# Patient Record
Sex: FEMALE | Race: OTHER | URBAN - METROPOLITAN AREA
[De-identification: names, ages, dates, MRNs, and addresses within clinical notes are randomized per-mention and may not be internally consistent; named-entity substitution may affect disease eponyms.]

---

## 2017-11-27 ENCOUNTER — EMERGENCY (EMERGENCY)
Facility: HOSPITAL | Age: 57
LOS: 1 days | Discharge: HOME | End: 2017-11-27
Attending: EMERGENCY MEDICINE
Payer: MEDICAID

## 2017-11-27 VITALS
TEMPERATURE: 98 F | OXYGEN SATURATION: 99 % | HEART RATE: 81 BPM | HEIGHT: 69 IN | DIASTOLIC BLOOD PRESSURE: 69 MMHG | WEIGHT: 125 LBS | RESPIRATION RATE: 18 BRPM | SYSTOLIC BLOOD PRESSURE: 146 MMHG

## 2017-11-27 LAB
ALBUMIN SERPL ELPH-MCNC: 3.3 G/DL — LOW (ref 3.5–5)
ANION GAP SERPL CALC-SCNC: 8 MMOL/L — SIGNIFICANT CHANGE UP (ref 5–17)
APTT BLD: 33.6 SEC — SIGNIFICANT CHANGE UP (ref 27.5–37.4)
BASOPHILS # BLD AUTO: 0.1 K/UL — SIGNIFICANT CHANGE UP (ref 0–0.2)
BASOPHILS NFR BLD AUTO: 0.9 % — SIGNIFICANT CHANGE UP (ref 0–2)
BUN SERPL-MCNC: 21 MG/DL — HIGH (ref 7–18)
CALCIUM SERPL-MCNC: 8.4 MG/DL — SIGNIFICANT CHANGE UP (ref 8.4–10.5)
CHLORIDE SERPL-SCNC: 101 MMOL/L — SIGNIFICANT CHANGE UP (ref 96–108)
CO2 SERPL-SCNC: 25 MMOL/L — SIGNIFICANT CHANGE UP (ref 22–31)
EOSINOPHIL # BLD AUTO: 0.1 K/UL — SIGNIFICANT CHANGE UP (ref 0–0.5)
EOSINOPHIL NFR BLD AUTO: 1.6 % — SIGNIFICANT CHANGE UP (ref 0–6)
GLUCOSE SERPL-MCNC: 89 MG/DL — SIGNIFICANT CHANGE UP (ref 70–99)
HCT VFR BLD CALC: 32 % — LOW (ref 34.5–45)
HGB BLD-MCNC: 10.2 G/DL — LOW (ref 11.5–15.5)
INR BLD: 0.96 RATIO — SIGNIFICANT CHANGE UP (ref 0.88–1.16)
LYMPHOCYTES # BLD AUTO: 2.9 K/UL — SIGNIFICANT CHANGE UP (ref 1–3.3)
LYMPHOCYTES # BLD AUTO: 40.3 % — SIGNIFICANT CHANGE UP (ref 13–44)
MCHC RBC-ENTMCNC: 28.6 PG — SIGNIFICANT CHANGE UP (ref 27–34)
MCHC RBC-ENTMCNC: 31.9 GM/DL — LOW (ref 32–36)
MCV RBC AUTO: 89.6 FL — SIGNIFICANT CHANGE UP (ref 80–100)
MONOCYTES # BLD AUTO: 0.7 K/UL — SIGNIFICANT CHANGE UP (ref 0–0.9)
MONOCYTES NFR BLD AUTO: 9.6 % — SIGNIFICANT CHANGE UP (ref 2–14)
NEUTROPHILS # BLD AUTO: 3.4 K/UL — SIGNIFICANT CHANGE UP (ref 1.8–7.4)
NEUTROPHILS NFR BLD AUTO: 47.6 % — SIGNIFICANT CHANGE UP (ref 43–77)
PLATELET # BLD AUTO: 159 K/UL — SIGNIFICANT CHANGE UP (ref 150–400)
POTASSIUM SERPL-MCNC: 3.9 MMOL/L — SIGNIFICANT CHANGE UP (ref 3.5–5.3)
POTASSIUM SERPL-SCNC: 3.9 MMOL/L — SIGNIFICANT CHANGE UP (ref 3.5–5.3)
PROTHROM AB SERPL-ACNC: 10.4 SEC — SIGNIFICANT CHANGE UP (ref 9.8–12.7)
RBC # BLD: 3.57 M/UL — LOW (ref 3.8–5.2)
RBC # FLD: 11.7 % — SIGNIFICANT CHANGE UP (ref 10.3–14.5)
SODIUM SERPL-SCNC: 134 MMOL/L — LOW (ref 135–145)
WBC # BLD: 7.1 K/UL — SIGNIFICANT CHANGE UP (ref 3.8–10.5)
WBC # FLD AUTO: 7.1 K/UL — SIGNIFICANT CHANGE UP (ref 3.8–10.5)

## 2017-11-27 PROCEDURE — 99285 EMERGENCY DEPT VISIT HI MDM: CPT | Mod: 25

## 2017-11-27 PROCEDURE — 71010: CPT | Mod: 26

## 2017-11-27 RX ORDER — SODIUM CHLORIDE 9 MG/ML
3 INJECTION INTRAMUSCULAR; INTRAVENOUS; SUBCUTANEOUS ONCE
Refills: 0 | Status: COMPLETED | OUTPATIENT
Start: 2017-11-27 | End: 2017-11-27

## 2017-11-27 RX ORDER — ONDANSETRON 8 MG/1
4 TABLET, FILM COATED ORAL ONCE
Refills: 0 | Status: COMPLETED | OUTPATIENT
Start: 2017-11-27 | End: 2017-11-27

## 2017-11-27 RX ORDER — SODIUM CHLORIDE 9 MG/ML
1000 INJECTION INTRAMUSCULAR; INTRAVENOUS; SUBCUTANEOUS ONCE
Refills: 0 | Status: COMPLETED | OUTPATIENT
Start: 2017-11-27 | End: 2017-11-27

## 2017-11-27 RX ORDER — FAMOTIDINE 10 MG/ML
20 INJECTION INTRAVENOUS ONCE
Refills: 0 | Status: COMPLETED | OUTPATIENT
Start: 2017-11-27 | End: 2017-11-27

## 2017-11-27 RX ADMIN — ONDANSETRON 4 MILLIGRAM(S): 8 TABLET, FILM COATED ORAL at 23:19

## 2017-11-27 RX ADMIN — SODIUM CHLORIDE 3 MILLILITER(S): 9 INJECTION INTRAMUSCULAR; INTRAVENOUS; SUBCUTANEOUS at 23:25

## 2017-11-27 RX ADMIN — SODIUM CHLORIDE 1000 MILLILITER(S): 9 INJECTION INTRAMUSCULAR; INTRAVENOUS; SUBCUTANEOUS at 23:19

## 2017-11-27 RX ADMIN — FAMOTIDINE 20 MILLIGRAM(S): 10 INJECTION INTRAVENOUS at 23:19

## 2017-11-27 NOTE — ED PROVIDER NOTE - OBJECTIVE STATEMENT
56 y/o F pt with PMHx of kidney problems, and PSHx of cholecystectomy, hysterectomy. presents to ED c/o abd pain with nausea, and vomiting for "weeks". Pt reports she was her PMD 2 days ago and had tests performed but does not have the results back yet. Pt has a scheduled sono exam tomorrow. Pt notes she has been able to tolerate solids and only has been drinking liquids. Pt denies fever, chills, shortness of breath, cough, chest pain, palpitations, dysuria, urinary frequency, hematuria, diaphoresis, back pain, or any other complaints. NKDA.

## 2017-11-27 NOTE — ED PROVIDER NOTE - CONSTITUTIONAL, MLM
normal... Appears to be older than stated age. Well appearing, well nourished, awake, alert, oriented to person, place, time/situation and in no apparent distress.

## 2017-11-27 NOTE — ED PROVIDER NOTE - CHPI ED SYMPTOMS NEG
no fever, no chills, no shortness of breath, no cough, no chest pain, no palpitations, no dysuria, no urinary frequency, no hematuria, no diaphoresis, no back pain

## 2017-11-27 NOTE — ED ADULT TRIAGE NOTE - WEIGHT METHOD
Gyne/Onc Focus Note  Victorina You PA-C  08/25/17   8:20 AM        Call place to , Tim (cell [phone]), this morning given concerns for Tayla's memory.  He was actually sitting with her.  He states yesterday he noticed this particularly in regards to her bedrest. She continues to try to get out of bed. He would reminder of her restrictions and she seemed to have totally forgotten.  She would even show her on the board.      She does have a psychiatric history including bipolar disorder and depression.  He states she had an episode similar to this may be 30-40 years ago where she was diagnosed with manic depression was placed on lithium.  She has since been diagnosed with bipolar and depression.  Home meds include risperdal 1mg daily and lithium 300mg TID.  She currently follows with psychiatry quarterly.    We also discuss any possible substances causing withdrawal. He denies any knowledge of her taking any other medications that are not prescribed. He states they drink about 2-3 drinks per night when they have \"cocktail hour \". She has been placed on [place] protocol.    Her psychiatrist is Dr. Trent Bahena with [facility].  Phone #[phone].    Call place to Dr. Wolf office. He is not in the office on Fridays. However, he does check his messages. His staff is unaware if he has privileges at [place]. Asked that they get this message to him as soon as possible to discuss continued care for Tayla.  We will await a return call to see how they would like to proceed or if they have any other recommendations.    
Gynecologic Oncology Focus note    Spoke with Dr. Francois's team at [facility] to update them on findings at surgery yesterday.  Already has referral to Medical and Radiation oncology.  Will assist in coordinating appointments during post op period.    All questions answered.    Farzana Klein MD.  [facility]     [address] [place] [address]  Phone: [phone]  Pager:  [phone]    
stated

## 2017-11-28 VITALS
TEMPERATURE: 98 F | HEART RATE: 87 BPM | OXYGEN SATURATION: 100 % | RESPIRATION RATE: 18 BRPM | DIASTOLIC BLOOD PRESSURE: 81 MMHG | SYSTOLIC BLOOD PRESSURE: 157 MMHG

## 2017-11-28 DIAGNOSIS — Z90.710 ACQUIRED ABSENCE OF BOTH CERVIX AND UTERUS: Chronic | ICD-10-CM

## 2017-11-28 DIAGNOSIS — Z90.49 ACQUIRED ABSENCE OF OTHER SPECIFIED PARTS OF DIGESTIVE TRACT: Chronic | ICD-10-CM

## 2017-11-28 LAB
ALP SERPL-CCNC: 71 U/L — SIGNIFICANT CHANGE UP (ref 40–120)
ALT FLD-CCNC: 19 U/L DA — SIGNIFICANT CHANGE UP (ref 10–60)
APPEARANCE UR: CLEAR — SIGNIFICANT CHANGE UP
AST SERPL-CCNC: 20 U/L — SIGNIFICANT CHANGE UP (ref 10–40)
BILIRUB SERPL-MCNC: 0.3 MG/DL — SIGNIFICANT CHANGE UP (ref 0.2–1.2)
BILIRUB UR-MCNC: NEGATIVE — SIGNIFICANT CHANGE UP
COLOR SPEC: YELLOW — SIGNIFICANT CHANGE UP
CREAT SERPL-MCNC: 1.66 MG/DL — HIGH (ref 0.5–1.3)
DIFF PNL FLD: NEGATIVE — SIGNIFICANT CHANGE UP
GLUCOSE UR QL: NEGATIVE — SIGNIFICANT CHANGE UP
KETONES UR-MCNC: NEGATIVE — SIGNIFICANT CHANGE UP
LEUKOCYTE ESTERASE UR-ACNC: ABNORMAL
LIDOCAIN IGE QN: 362 U/L — SIGNIFICANT CHANGE UP (ref 73–393)
NITRITE UR-MCNC: NEGATIVE — SIGNIFICANT CHANGE UP
PH UR: 6.5 — SIGNIFICANT CHANGE UP (ref 5–8)
PROT SERPL-MCNC: 7.3 G/DL — SIGNIFICANT CHANGE UP (ref 6–8.3)
PROT UR-MCNC: NEGATIVE — SIGNIFICANT CHANGE UP
SP GR SPEC: 1 — LOW (ref 1.01–1.02)
TROPONIN I SERPL-MCNC: <0.015 NG/ML — SIGNIFICANT CHANGE UP (ref 0–0.04)
UROBILINOGEN FLD QL: NEGATIVE — SIGNIFICANT CHANGE UP

## 2017-11-28 PROCEDURE — 84484 ASSAY OF TROPONIN QUANT: CPT

## 2017-11-28 PROCEDURE — 74176 CT ABD & PELVIS W/O CONTRAST: CPT

## 2017-11-28 PROCEDURE — 96375 TX/PRO/DX INJ NEW DRUG ADDON: CPT

## 2017-11-28 PROCEDURE — 83690 ASSAY OF LIPASE: CPT

## 2017-11-28 PROCEDURE — 85730 THROMBOPLASTIN TIME PARTIAL: CPT

## 2017-11-28 PROCEDURE — 99284 EMERGENCY DEPT VISIT MOD MDM: CPT | Mod: 25

## 2017-11-28 PROCEDURE — 85610 PROTHROMBIN TIME: CPT

## 2017-11-28 PROCEDURE — 74176 CT ABD & PELVIS W/O CONTRAST: CPT | Mod: 26

## 2017-11-28 PROCEDURE — 81001 URINALYSIS AUTO W/SCOPE: CPT

## 2017-11-28 PROCEDURE — 85027 COMPLETE CBC AUTOMATED: CPT

## 2017-11-28 PROCEDURE — 71045 X-RAY EXAM CHEST 1 VIEW: CPT

## 2017-11-28 PROCEDURE — 87086 URINE CULTURE/COLONY COUNT: CPT

## 2017-11-28 PROCEDURE — 93005 ELECTROCARDIOGRAM TRACING: CPT

## 2017-11-28 PROCEDURE — 96374 THER/PROPH/DIAG INJ IV PUSH: CPT | Mod: XU

## 2017-11-28 PROCEDURE — 80053 COMPREHEN METABOLIC PANEL: CPT

## 2017-11-29 LAB
CULTURE RESULTS: SIGNIFICANT CHANGE UP
CULTURE RESULTS: SIGNIFICANT CHANGE UP
SPECIMEN SOURCE: SIGNIFICANT CHANGE UP

## 2018-11-24 ENCOUNTER — EMERGENCY (EMERGENCY)
Facility: HOSPITAL | Age: 58
LOS: 1 days | Discharge: HOME | End: 2018-11-24
Attending: EMERGENCY MEDICINE | Admitting: EMERGENCY MEDICINE
Payer: MEDICAID

## 2018-11-24 VITALS — DIASTOLIC BLOOD PRESSURE: 99 MMHG | HEART RATE: 83 BPM | SYSTOLIC BLOOD PRESSURE: 162 MMHG

## 2018-11-24 VITALS
DIASTOLIC BLOOD PRESSURE: 89 MMHG | HEART RATE: 87 BPM | TEMPERATURE: 98 F | OXYGEN SATURATION: 99 % | SYSTOLIC BLOOD PRESSURE: 187 MMHG | RESPIRATION RATE: 16 BRPM

## 2018-11-24 DIAGNOSIS — Z90.49 ACQUIRED ABSENCE OF OTHER SPECIFIED PARTS OF DIGESTIVE TRACT: Chronic | ICD-10-CM

## 2018-11-24 DIAGNOSIS — Z90.710 ACQUIRED ABSENCE OF BOTH CERVIX AND UTERUS: Chronic | ICD-10-CM

## 2018-11-24 LAB
ALBUMIN SERPL ELPH-MCNC: 4.3 G/DL — SIGNIFICANT CHANGE UP (ref 3.3–5)
ALP SERPL-CCNC: 79 U/L — SIGNIFICANT CHANGE UP (ref 40–120)
ALT FLD-CCNC: 23 U/L — SIGNIFICANT CHANGE UP (ref 4–33)
APPEARANCE UR: CLEAR — SIGNIFICANT CHANGE UP
APTT BLD: 34.8 SEC — SIGNIFICANT CHANGE UP (ref 27.5–36.3)
AST SERPL-CCNC: 26 U/L — SIGNIFICANT CHANGE UP (ref 4–32)
BACTERIA # UR AUTO: NEGATIVE — SIGNIFICANT CHANGE UP
BASE EXCESS BLDV CALC-SCNC: -1.2 MMOL/L — SIGNIFICANT CHANGE UP
BASOPHILS # BLD AUTO: 0.03 K/UL — SIGNIFICANT CHANGE UP (ref 0–0.2)
BASOPHILS NFR BLD AUTO: 0.3 % — SIGNIFICANT CHANGE UP (ref 0–2)
BILIRUB SERPL-MCNC: 0.3 MG/DL — SIGNIFICANT CHANGE UP (ref 0.2–1.2)
BILIRUB UR-MCNC: NEGATIVE — SIGNIFICANT CHANGE UP
BLOOD GAS VENOUS - CREATININE: 1.48 MG/DL — HIGH (ref 0.5–1.3)
BLOOD UR QL VISUAL: NEGATIVE — SIGNIFICANT CHANGE UP
BUN SERPL-MCNC: 15 MG/DL — SIGNIFICANT CHANGE UP (ref 7–23)
CALCIUM SERPL-MCNC: 9 MG/DL — SIGNIFICANT CHANGE UP (ref 8.4–10.5)
CHLORIDE BLDV-SCNC: 99 MMOL/L — SIGNIFICANT CHANGE UP (ref 96–108)
CHLORIDE SERPL-SCNC: 98 MMOL/L — SIGNIFICANT CHANGE UP (ref 98–107)
CO2 SERPL-SCNC: 21 MMOL/L — LOW (ref 22–31)
COLOR SPEC: COLORLESS — SIGNIFICANT CHANGE UP
CREAT SERPL-MCNC: 1.45 MG/DL — HIGH (ref 0.5–1.3)
EOSINOPHIL # BLD AUTO: 0.08 K/UL — SIGNIFICANT CHANGE UP (ref 0–0.5)
EOSINOPHIL NFR BLD AUTO: 0.9 % — SIGNIFICANT CHANGE UP (ref 0–6)
GAS PNL BLDV: 131 MMOL/L — LOW (ref 136–146)
GLUCOSE BLDV-MCNC: 115 — HIGH (ref 70–99)
GLUCOSE SERPL-MCNC: 108 MG/DL — HIGH (ref 70–99)
GLUCOSE UR-MCNC: NEGATIVE — SIGNIFICANT CHANGE UP
HCO3 BLDV-SCNC: 22 MMOL/L — SIGNIFICANT CHANGE UP (ref 20–27)
HCT VFR BLD CALC: 34.7 % — SIGNIFICANT CHANGE UP (ref 34.5–45)
HCT VFR BLDV CALC: 35.2 % — SIGNIFICANT CHANGE UP (ref 34.5–45)
HGB BLD-MCNC: 11.1 G/DL — LOW (ref 11.5–15.5)
HGB BLDV-MCNC: 11.4 G/DL — LOW (ref 11.5–15.5)
HYALINE CASTS # UR AUTO: NEGATIVE — SIGNIFICANT CHANGE UP
IMM GRANULOCYTES # BLD AUTO: 0.02 # — SIGNIFICANT CHANGE UP
IMM GRANULOCYTES NFR BLD AUTO: 0.2 % — SIGNIFICANT CHANGE UP (ref 0–1.5)
INR BLD: 0.9 — SIGNIFICANT CHANGE UP (ref 0.88–1.17)
KETONES UR-MCNC: NEGATIVE — SIGNIFICANT CHANGE UP
LACTATE BLDV-MCNC: 1.1 MMOL/L — SIGNIFICANT CHANGE UP (ref 0.5–2)
LEUKOCYTE ESTERASE UR-ACNC: SIGNIFICANT CHANGE UP
LYMPHOCYTES # BLD AUTO: 3.01 K/UL — SIGNIFICANT CHANGE UP (ref 1–3.3)
LYMPHOCYTES # BLD AUTO: 33.1 % — SIGNIFICANT CHANGE UP (ref 13–44)
MCHC RBC-ENTMCNC: 26.9 PG — LOW (ref 27–34)
MCHC RBC-ENTMCNC: 32 % — SIGNIFICANT CHANGE UP (ref 32–36)
MCV RBC AUTO: 84 FL — SIGNIFICANT CHANGE UP (ref 80–100)
MONOCYTES # BLD AUTO: 0.72 K/UL — SIGNIFICANT CHANGE UP (ref 0–0.9)
MONOCYTES NFR BLD AUTO: 7.9 % — SIGNIFICANT CHANGE UP (ref 2–14)
NEUTROPHILS # BLD AUTO: 5.24 K/UL — SIGNIFICANT CHANGE UP (ref 1.8–7.4)
NEUTROPHILS NFR BLD AUTO: 57.6 % — SIGNIFICANT CHANGE UP (ref 43–77)
NITRITE UR-MCNC: NEGATIVE — SIGNIFICANT CHANGE UP
NRBC # FLD: 0 — SIGNIFICANT CHANGE UP
PCO2 BLDV: 45 MMHG — SIGNIFICANT CHANGE UP (ref 41–51)
PH BLDV: 7.34 PH — SIGNIFICANT CHANGE UP (ref 7.32–7.43)
PH UR: 6 — SIGNIFICANT CHANGE UP (ref 5–8)
PLATELET # BLD AUTO: 210 K/UL — SIGNIFICANT CHANGE UP (ref 150–400)
PMV BLD: 12.1 FL — SIGNIFICANT CHANGE UP (ref 7–13)
PO2 BLDV: 32 MMHG — LOW (ref 35–40)
POTASSIUM BLDV-SCNC: 4 MMOL/L — SIGNIFICANT CHANGE UP (ref 3.4–4.5)
POTASSIUM SERPL-MCNC: 4.2 MMOL/L — SIGNIFICANT CHANGE UP (ref 3.5–5.3)
POTASSIUM SERPL-SCNC: 4.2 MMOL/L — SIGNIFICANT CHANGE UP (ref 3.5–5.3)
PROT SERPL-MCNC: 7.7 G/DL — SIGNIFICANT CHANGE UP (ref 6–8.3)
PROT UR-MCNC: NEGATIVE — SIGNIFICANT CHANGE UP
PROTHROM AB SERPL-ACNC: 10 SEC — SIGNIFICANT CHANGE UP (ref 9.8–13.1)
RBC # BLD: 4.13 M/UL — SIGNIFICANT CHANGE UP (ref 3.8–5.2)
RBC # FLD: 12.6 % — SIGNIFICANT CHANGE UP (ref 10.3–14.5)
RBC CASTS # UR COMP ASSIST: SIGNIFICANT CHANGE UP (ref 0–?)
SAO2 % BLDV: 57.9 % — LOW (ref 60–85)
SODIUM SERPL-SCNC: 132 MMOL/L — LOW (ref 135–145)
SP GR SPEC: 1.01 — SIGNIFICANT CHANGE UP (ref 1–1.04)
SQUAMOUS # UR AUTO: SIGNIFICANT CHANGE UP
TROPONIN T, HIGH SENSITIVITY: 8 NG/L — SIGNIFICANT CHANGE UP (ref ?–14)
TROPONIN T, HIGH SENSITIVITY: 8 NG/L — SIGNIFICANT CHANGE UP (ref ?–14)
UROBILINOGEN FLD QL: NORMAL — SIGNIFICANT CHANGE UP
WBC # BLD: 9.1 K/UL — SIGNIFICANT CHANGE UP (ref 3.8–10.5)
WBC # FLD AUTO: 9.1 K/UL — SIGNIFICANT CHANGE UP (ref 3.8–10.5)
WBC UR QL: SIGNIFICANT CHANGE UP (ref 0–?)

## 2018-11-24 PROCEDURE — 74174 CTA ABD&PLVS W/CONTRAST: CPT | Mod: 26

## 2018-11-24 PROCEDURE — 71275 CT ANGIOGRAPHY CHEST: CPT | Mod: 26

## 2018-11-24 PROCEDURE — 71046 X-RAY EXAM CHEST 2 VIEWS: CPT | Mod: 26

## 2018-11-24 PROCEDURE — 99284 EMERGENCY DEPT VISIT MOD MDM: CPT

## 2018-11-24 RX ORDER — ACETAMINOPHEN 500 MG
650 TABLET ORAL ONCE
Qty: 0 | Refills: 0 | Status: COMPLETED | OUTPATIENT
Start: 2018-11-24 | End: 2018-11-24

## 2018-11-24 RX ORDER — METOCLOPRAMIDE HCL 10 MG
10 TABLET ORAL ONCE
Qty: 0 | Refills: 0 | Status: COMPLETED | OUTPATIENT
Start: 2018-11-24 | End: 2018-11-24

## 2018-11-24 RX ADMIN — Medication 10 MILLIGRAM(S): at 22:37

## 2018-11-24 RX ADMIN — Medication 650 MILLIGRAM(S): at 20:49

## 2018-11-24 RX ADMIN — Medication 650 MILLIGRAM(S): at 22:37

## 2018-11-24 NOTE — ED PROVIDER NOTE - MEDICAL DECISION MAKING DETAILS
likely MSk pain, r/o ACS, aortic dissection, chf, pna. do not suspect ICH or stroke given no focal neuro deficits and negative CT head recently. Plan for pain control, EKG, CXR, CTA chest/a/p, labs, re-eval.

## 2018-11-24 NOTE — ED PROVIDER NOTE - OBJECTIVE STATEMENT
58F with  pmh HTN here with 1 week of elevated BP, intermittent left sided chest pain radiating to L upper back, dizziness and 2 months of intermittent headache. Pt reports negative CT head in last 1 month. Also saw her PMD and BP meds were adjusted this week. Denies syncope, cough, f/c, leg swelling, focal neuro symptoms, vomiting. Pt took her metoprolol this morning but not the evening dose.

## 2018-11-24 NOTE — ED PROVIDER NOTE - PHYSICAL EXAMINATION
GEN - NAD; well appearing; A+O x3   HEAD - NC/AT     EYES - EOMI, no conjunctival pallor, no scleral icterus  ENT -   mucous membranes  moist , no discharge      NECK - Neck supple  PULM - CTA b/l,  symmetric breath sounds  COR -  RRR, S1 S2, no murmurs, equal DP and radial pulses  ABD - , ND, NT, soft, no guarding, no rebound, no masses    BACK - no CVA tenderness, nontender spine     EXTREMS - no edema, no deformity, warm and well perfused    SKIN - no rash or bruising. no rash to back or chest.  NEUROLOGIC - A&Ox3, PERRLA, EOMI, no nystagmus, CN2-12 grossly intact, no pronator drift, normal finger to nose and rapid alternating finger movements, normal sensation and 5/5 strength in all extremities, normal gait, symmetric patellar reflexes

## 2018-11-24 NOTE — ED PROVIDER NOTE - PROGRESS NOTE DETAILS
BP more elevated now, pt did not take her dose of metoprolol tonight, will inform that can take her home dose now. BP improved, CTA negatives, trop stable. Pt still with some headache, but very comfortable appearing. will give Reglan.  Family no longer at beside, attempted calling but wrong number listed.  Will await family to come back. Family now at beside. pt feels well. will f/u with  PMD on monday for further med adjustment. will keep BP log until then. provided with results. will d/c home.

## 2018-11-24 NOTE — ED ADULT NURSE NOTE - OBJECTIVE STATEMENT
pt received in exam room 26. alert and oriented x3, ambulatory. co pain under left breast radiating into back x 3 days. daughter also states patient has been nauseous and her blood pressure has been more elevated than normal. Baseline systolic 120's but now in 180's. labs sent. IV placed. VS as stated. Comfort measures provided. awaiting MD rodriguez.

## 2018-11-24 NOTE — ED ADULT TRIAGE NOTE - CHIEF COMPLAINT QUOTE
c/o left chest pain under left breast radiating into left upper back with neck pain and head pain x 3 days. As per daughter, blood pressure has been elevated x 3 days. Hx HTN, kidney problems

## 2018-11-26 LAB
BACTERIA UR CULT: SIGNIFICANT CHANGE UP
SPECIMEN SOURCE: SIGNIFICANT CHANGE UP

## 2018-12-01 ENCOUNTER — OUTPATIENT (OUTPATIENT)
Dept: OUTPATIENT SERVICES | Facility: HOSPITAL | Age: 58
LOS: 1 days | End: 2018-12-01
Payer: MEDICAID

## 2018-12-01 DIAGNOSIS — Z90.49 ACQUIRED ABSENCE OF OTHER SPECIFIED PARTS OF DIGESTIVE TRACT: Chronic | ICD-10-CM

## 2018-12-01 DIAGNOSIS — Z90.710 ACQUIRED ABSENCE OF BOTH CERVIX AND UTERUS: Chronic | ICD-10-CM

## 2018-12-14 ENCOUNTER — INPATIENT (INPATIENT)
Facility: HOSPITAL | Age: 58
LOS: 3 days | Discharge: HOME HEALTH | End: 2018-12-18
Attending: INTERNAL MEDICINE | Admitting: INTERNAL MEDICINE
Payer: MEDICAID

## 2018-12-14 VITALS
TEMPERATURE: 98 F | SYSTOLIC BLOOD PRESSURE: 161 MMHG | DIASTOLIC BLOOD PRESSURE: 86 MMHG | RESPIRATION RATE: 18 BRPM | HEART RATE: 68 BPM | OXYGEN SATURATION: 100 %

## 2018-12-14 DIAGNOSIS — Z90.710 ACQUIRED ABSENCE OF BOTH CERVIX AND UTERUS: Chronic | ICD-10-CM

## 2018-12-14 DIAGNOSIS — K52.9 NONINFECTIVE GASTROENTERITIS AND COLITIS, UNSPECIFIED: ICD-10-CM

## 2018-12-14 DIAGNOSIS — Z90.49 ACQUIRED ABSENCE OF OTHER SPECIFIED PARTS OF DIGESTIVE TRACT: Chronic | ICD-10-CM

## 2018-12-14 LAB
ALBUMIN SERPL ELPH-MCNC: 4.4 G/DL — SIGNIFICANT CHANGE UP (ref 3.3–5)
ALP SERPL-CCNC: 87 U/L — SIGNIFICANT CHANGE UP (ref 40–120)
ALT FLD-CCNC: 23 U/L — SIGNIFICANT CHANGE UP (ref 4–33)
APPEARANCE UR: CLEAR — SIGNIFICANT CHANGE UP
APTT BLD: 30.9 SEC — SIGNIFICANT CHANGE UP (ref 27.5–36.3)
AST SERPL-CCNC: 25 U/L — SIGNIFICANT CHANGE UP (ref 4–32)
BACTERIA # UR AUTO: NEGATIVE — SIGNIFICANT CHANGE UP
BASE EXCESS BLDV CALC-SCNC: -1.8 MMOL/L — SIGNIFICANT CHANGE UP
BASOPHILS # BLD AUTO: 0.02 K/UL — SIGNIFICANT CHANGE UP (ref 0–0.2)
BASOPHILS NFR BLD AUTO: 0.2 % — SIGNIFICANT CHANGE UP (ref 0–2)
BILIRUB SERPL-MCNC: 0.4 MG/DL — SIGNIFICANT CHANGE UP (ref 0.2–1.2)
BILIRUB UR-MCNC: NEGATIVE — SIGNIFICANT CHANGE UP
BLOOD GAS VENOUS - CREATININE: 1.17 MG/DL — SIGNIFICANT CHANGE UP (ref 0.5–1.3)
BLOOD UR QL VISUAL: NEGATIVE — SIGNIFICANT CHANGE UP
BUN SERPL-MCNC: 13 MG/DL — SIGNIFICANT CHANGE UP (ref 7–23)
CALCIUM SERPL-MCNC: 9.1 MG/DL — SIGNIFICANT CHANGE UP (ref 8.4–10.5)
CHLORIDE BLDV-SCNC: 98 MMOL/L — SIGNIFICANT CHANGE UP (ref 96–108)
CHLORIDE SERPL-SCNC: 94 MMOL/L — LOW (ref 98–107)
CO2 SERPL-SCNC: 20 MMOL/L — LOW (ref 22–31)
COLOR SPEC: COLORLESS — SIGNIFICANT CHANGE UP
CREAT SERPL-MCNC: 1.4 MG/DL — HIGH (ref 0.5–1.3)
EOSINOPHIL # BLD AUTO: 0.04 K/UL — SIGNIFICANT CHANGE UP (ref 0–0.5)
EOSINOPHIL NFR BLD AUTO: 0.4 % — SIGNIFICANT CHANGE UP (ref 0–6)
GAS PNL BLDV: 123 MMOL/L — LOW (ref 136–146)
GLUCOSE BLDV-MCNC: 112 — HIGH (ref 70–99)
GLUCOSE SERPL-MCNC: 101 MG/DL — HIGH (ref 70–99)
GLUCOSE UR-MCNC: NEGATIVE — SIGNIFICANT CHANGE UP
HCO3 BLDV-SCNC: 22 MMOL/L — SIGNIFICANT CHANGE UP (ref 20–27)
HCT VFR BLD CALC: 36.4 % — SIGNIFICANT CHANGE UP (ref 34.5–45)
HCT VFR BLDV CALC: 37.1 % — SIGNIFICANT CHANGE UP (ref 34.5–45)
HGB BLD-MCNC: 12 G/DL — SIGNIFICANT CHANGE UP (ref 11.5–15.5)
HGB BLDV-MCNC: 12.1 G/DL — SIGNIFICANT CHANGE UP (ref 11.5–15.5)
HYALINE CASTS # UR AUTO: NEGATIVE — SIGNIFICANT CHANGE UP
IMM GRANULOCYTES # BLD AUTO: 0.03 # — SIGNIFICANT CHANGE UP
IMM GRANULOCYTES NFR BLD AUTO: 0.3 % — SIGNIFICANT CHANGE UP (ref 0–1.5)
INR BLD: 0.97 — SIGNIFICANT CHANGE UP (ref 0.88–1.17)
KETONES UR-MCNC: NEGATIVE — SIGNIFICANT CHANGE UP
LACTATE BLDV-MCNC: 1.1 MMOL/L — SIGNIFICANT CHANGE UP (ref 0.5–2)
LEUKOCYTE ESTERASE UR-ACNC: SIGNIFICANT CHANGE UP
LYMPHOCYTES # BLD AUTO: 2.77 K/UL — SIGNIFICANT CHANGE UP (ref 1–3.3)
LYMPHOCYTES # BLD AUTO: 26.7 % — SIGNIFICANT CHANGE UP (ref 13–44)
MAGNESIUM SERPL-MCNC: 1.3 MG/DL — LOW (ref 1.6–2.6)
MCHC RBC-ENTMCNC: 27.1 PG — SIGNIFICANT CHANGE UP (ref 27–34)
MCHC RBC-ENTMCNC: 33 % — SIGNIFICANT CHANGE UP (ref 32–36)
MCV RBC AUTO: 82.2 FL — SIGNIFICANT CHANGE UP (ref 80–100)
MONOCYTES # BLD AUTO: 0.66 K/UL — SIGNIFICANT CHANGE UP (ref 0–0.9)
MONOCYTES NFR BLD AUTO: 6.4 % — SIGNIFICANT CHANGE UP (ref 2–14)
NEUTROPHILS # BLD AUTO: 6.86 K/UL — SIGNIFICANT CHANGE UP (ref 1.8–7.4)
NEUTROPHILS NFR BLD AUTO: 66 % — SIGNIFICANT CHANGE UP (ref 43–77)
NITRITE UR-MCNC: NEGATIVE — SIGNIFICANT CHANGE UP
NRBC # FLD: 0 — SIGNIFICANT CHANGE UP
OSMOLALITY SERPL: 270 MOSMO/KG — LOW (ref 275–295)
PCO2 BLDV: 37 MMHG — LOW (ref 41–51)
PH BLDV: 7.4 PH — SIGNIFICANT CHANGE UP (ref 7.32–7.43)
PH UR: 6 — SIGNIFICANT CHANGE UP (ref 5–8)
PHOSPHATE SERPL-MCNC: 3 MG/DL — SIGNIFICANT CHANGE UP (ref 2.5–4.5)
PLATELET # BLD AUTO: 217 K/UL — SIGNIFICANT CHANGE UP (ref 150–400)
PMV BLD: 12.2 FL — SIGNIFICANT CHANGE UP (ref 7–13)
PO2 BLDV: 29 MMHG — LOW (ref 35–40)
POTASSIUM BLDV-SCNC: 4.4 MMOL/L — SIGNIFICANT CHANGE UP (ref 3.4–4.5)
POTASSIUM SERPL-MCNC: 4.7 MMOL/L — SIGNIFICANT CHANGE UP (ref 3.5–5.3)
POTASSIUM SERPL-SCNC: 4.7 MMOL/L — SIGNIFICANT CHANGE UP (ref 3.5–5.3)
PROT SERPL-MCNC: 7.8 G/DL — SIGNIFICANT CHANGE UP (ref 6–8.3)
PROT UR-MCNC: NEGATIVE — SIGNIFICANT CHANGE UP
PROTHROM AB SERPL-ACNC: 10.7 SEC — SIGNIFICANT CHANGE UP (ref 9.8–13.1)
RBC # BLD: 4.43 M/UL — SIGNIFICANT CHANGE UP (ref 3.8–5.2)
RBC # FLD: 12.6 % — SIGNIFICANT CHANGE UP (ref 10.3–14.5)
RBC CASTS # UR COMP ASSIST: SIGNIFICANT CHANGE UP (ref 0–?)
SAO2 % BLDV: 51.7 % — LOW (ref 60–85)
SODIUM SERPL-SCNC: 127 MMOL/L — LOW (ref 135–145)
SP GR SPEC: 1.01 — SIGNIFICANT CHANGE UP (ref 1–1.04)
SQUAMOUS # UR AUTO: SIGNIFICANT CHANGE UP
UROBILINOGEN FLD QL: NORMAL — SIGNIFICANT CHANGE UP
WBC # BLD: 10.38 K/UL — SIGNIFICANT CHANGE UP (ref 3.8–10.5)
WBC # FLD AUTO: 10.38 K/UL — SIGNIFICANT CHANGE UP (ref 3.8–10.5)
WBC UR QL: SIGNIFICANT CHANGE UP (ref 0–?)

## 2018-12-14 PROCEDURE — 70450 CT HEAD/BRAIN W/O DYE: CPT | Mod: 26

## 2018-12-14 PROCEDURE — 74177 CT ABD & PELVIS W/CONTRAST: CPT | Mod: 26

## 2018-12-14 RX ORDER — ACETAMINOPHEN 500 MG
650 TABLET ORAL ONCE
Qty: 0 | Refills: 0 | Status: COMPLETED | OUTPATIENT
Start: 2018-12-14 | End: 2018-12-14

## 2018-12-14 RX ORDER — SODIUM CHLORIDE 9 MG/ML
1000 INJECTION INTRAMUSCULAR; INTRAVENOUS; SUBCUTANEOUS ONCE
Qty: 0 | Refills: 0 | Status: COMPLETED | OUTPATIENT
Start: 2018-12-14 | End: 2018-12-14

## 2018-12-14 RX ORDER — METOCLOPRAMIDE HCL 10 MG
10 TABLET ORAL ONCE
Qty: 0 | Refills: 0 | Status: COMPLETED | OUTPATIENT
Start: 2018-12-14 | End: 2018-12-14

## 2018-12-14 RX ADMIN — SODIUM CHLORIDE 1000 MILLILITER(S): 9 INJECTION INTRAMUSCULAR; INTRAVENOUS; SUBCUTANEOUS at 20:11

## 2018-12-14 RX ADMIN — Medication 650 MILLIGRAM(S): at 20:10

## 2018-12-14 RX ADMIN — Medication 10 MILLIGRAM(S): at 20:10

## 2018-12-14 NOTE — ED ADULT TRIAGE NOTE - CHIEF COMPLAINT QUOTE
Pt c/o headache, nausea, vomiting and weakness.  Pt's daughter states that she has been checking her blood pressure all day and getting readings 180 systolic.  PMH HTN, CRI, hyponatremia

## 2018-12-14 NOTE — ED PROVIDER NOTE - MEDICAL DECISION MAKING DETAILS
58 year old female with a PMHx of CVA, HTN, chronic headache, GERD PSHx of cholecystectomy, hysterectomy, thyroid cyst removal pw worsening headache/room spinning dizziness, ~6 episodes of NB/NB vomiting - not tolerating PO, chills, generalized weakness, LUE/LLE parasthesias and increased blood pressure  Plan: ct head - r/o mass/CVA, labs, ua - r/o UTI, CT ab pelvis r/o appendicitis

## 2018-12-14 NOTE — ED ADULT NURSE NOTE - NSIMPLEMENTINTERV_GEN_ALL_ED
Implemented All Universal Safety Interventions:  [place] to call system. Call bell, personal items and telephone within reach. Instruct patient to call for assistance. Room bathroom lighting operational. Non-slip footwear when patient is off stretcher. Physically safe environment: no spills, clutter or unnecessary equipment. Stretcher in lowest position, wheels locked, appropriate side rails in place.

## 2018-12-14 NOTE — ED PROVIDER NOTE - OBJECTIVE STATEMENT
58 year old female with a PMHx of CVA, HTN, chronic headache, GERD pw worsening headache/room spinning dizziness, ~6 episodes of NB/NB vomiting - not tolerating PO, and increased blood pressure. Pt has been seen by her PCP 3 months had MRI brain which was WNL. Pt seen here on 11/30 presented with CP and dizziness had CTA chest/abd - negative for dissection. 58 year old female with a PMHx of CVA, HTN, chronic headache, GERD PSHx of cholecystectomy, hysterectomy, thyroid cyst removal pw worsening headache/room spinning dizziness, ~6 episodes of NB/NB vomiting - not tolerating PO, chills, generalized weakness, LUE/LLE parasthesias and increased blood pressure. Pt has been seen by her PCP 3 months had MRI brain which was WNL. Pt seen here on 11/30 presented with CP and dizziness had CTA chest/abd - negative for dissection. Also endorsing dysuria for the past few days and diffuse abdominal pain - worse in the epigastric area - chronic pain as per daughter. Pt passing gas having bowel movements. Daughter by the bedside states that she recently stopped taking Clonazepam 1 week ago that was prescribed for her 3 months ago for headache (increased dose 3 weeks ago). She states that she spoke to her PCP Dr. Quinones who told her to come to the ED for increasing BP (systolic ~190's). Denies head trauma, fever, CP, hematuria, melena, hematochezia. 58 year old female with a PMHx of CVA, HTN, chronic headache, GERD PSHx of cholecystectomy, hysterectomy, thyroid cyst removal pw worsening headache/room spinning dizziness, ~6 episodes of NB/NB vomiting - not tolerating PO, chills, generalized weakness, LUE/LLE parasthesias and increased blood pressure. Pt has been seen by her PCP 3 months had MRI brain which was WNL. Pt seen here on  presented with CP and dizziness had CTA chest/abd - negative for dissection. Also endorsing dysuria for the past few days and diffuse abdominal pain - worse in the epigastric area - chronic pain as per daughter. Pt passing gas having bowel movements. Daughter by the bedside states that she recently stopped taking Clonazepam 1 week ago that was prescribed for her 3 months ago for headache (increased dose 3 weeks ago). She states that she spoke to her PCP Dr. Quinones who told her to come to the ED for increasing BP (systolic ~190's). Denies head trauma, fever, CP, hematuria, melena, hematochezia.    Attendinyo female presents with headaches, nausea, vomiting and feeling weak. has had these symptoms for months.  no fever.  seem worse today.

## 2018-12-14 NOTE — ED PROVIDER NOTE - CONSTITUTIONAL DEVELOPMENT, MLM
well developed
I have reviewed and confirmed nurses' notes for patient's medications, allergies, medical history, and surgical history.

## 2018-12-14 NOTE — ED PROVIDER NOTE - PMH
CVA (cerebral vascular accident)    GERD (gastroesophageal reflux disease)    Hypertension, unspecified type

## 2018-12-14 NOTE — ED ADULT NURSE NOTE - OBJECTIVE STATEMENT
pt alert,oriented x3. Icelandic speaking,daughter at  bedside/translate. reported weakness ,vomited this pm,c/o lower abd pains. denies fever,denies ch pains..  reports burning urination x past 4 days

## 2018-12-15 DIAGNOSIS — Z29.9 ENCOUNTER FOR PROPHYLACTIC MEASURES, UNSPECIFIED: ICD-10-CM

## 2018-12-15 DIAGNOSIS — E03.9 HYPOTHYROIDISM, UNSPECIFIED: ICD-10-CM

## 2018-12-15 DIAGNOSIS — E87.1 HYPO-OSMOLALITY AND HYPONATREMIA: ICD-10-CM

## 2018-12-15 DIAGNOSIS — K21.9 GASTRO-ESOPHAGEAL REFLUX DISEASE WITHOUT ESOPHAGITIS: ICD-10-CM

## 2018-12-15 DIAGNOSIS — I10 ESSENTIAL (PRIMARY) HYPERTENSION: ICD-10-CM

## 2018-12-15 DIAGNOSIS — R42 DIZZINESS AND GIDDINESS: ICD-10-CM

## 2018-12-15 DIAGNOSIS — E87.2 ACIDOSIS: ICD-10-CM

## 2018-12-15 DIAGNOSIS — N17.9 ACUTE KIDNEY FAILURE, UNSPECIFIED: ICD-10-CM

## 2018-12-15 DIAGNOSIS — K52.9 NONINFECTIVE GASTROENTERITIS AND COLITIS, UNSPECIFIED: ICD-10-CM

## 2018-12-15 DIAGNOSIS — N18.3 CHRONIC KIDNEY DISEASE, STAGE 3 (MODERATE): ICD-10-CM

## 2018-12-15 LAB
BUN SERPL-MCNC: 12 MG/DL — SIGNIFICANT CHANGE UP (ref 7–23)
CALCIUM SERPL-MCNC: 8.9 MG/DL — SIGNIFICANT CHANGE UP (ref 8.4–10.5)
CHLORIDE SERPL-SCNC: 102 MMOL/L — SIGNIFICANT CHANGE UP (ref 98–107)
CHLORIDE UR-SCNC: 59 MMOL/L — SIGNIFICANT CHANGE UP
CO2 SERPL-SCNC: 21 MMOL/L — LOW (ref 22–31)
CREAT ?TM UR-MCNC: 23.1 MG/DL — SIGNIFICANT CHANGE UP
CREAT SERPL-MCNC: 1.46 MG/DL — HIGH (ref 0.5–1.3)
GLUCOSE SERPL-MCNC: 95 MG/DL — SIGNIFICANT CHANGE UP (ref 70–99)
MAGNESIUM SERPL-MCNC: 2 MG/DL — SIGNIFICANT CHANGE UP (ref 1.6–2.6)
OSMOLALITY UR: 191 MOSMO/KG — SIGNIFICANT CHANGE UP (ref 50–1200)
PHOSPHATE SERPL-MCNC: 3.9 MG/DL — SIGNIFICANT CHANGE UP (ref 2.5–4.5)
POTASSIUM SERPL-MCNC: 4.5 MMOL/L — SIGNIFICANT CHANGE UP (ref 3.5–5.3)
POTASSIUM SERPL-SCNC: 4.5 MMOL/L — SIGNIFICANT CHANGE UP (ref 3.5–5.3)
POTASSIUM UR-SCNC: 14.7 MMOL/L — SIGNIFICANT CHANGE UP
SODIUM SERPL-SCNC: 134 MMOL/L — LOW (ref 135–145)
SODIUM UR-SCNC: 56 MMOL/L — SIGNIFICANT CHANGE UP

## 2018-12-15 PROCEDURE — 12345: CPT | Mod: NC

## 2018-12-15 PROCEDURE — 99223 1ST HOSP IP/OBS HIGH 75: CPT | Mod: GC

## 2018-12-15 RX ORDER — CLONAZEPAM 1 MG
0.5 TABLET ORAL DAILY
Qty: 0 | Refills: 0 | Status: DISCONTINUED | OUTPATIENT
Start: 2018-12-15 | End: 2018-12-18

## 2018-12-15 RX ORDER — HEPARIN SODIUM 5000 [USP'U]/ML
5000 INJECTION INTRAVENOUS; SUBCUTANEOUS EVERY 8 HOURS
Qty: 0 | Refills: 0 | Status: DISCONTINUED | OUTPATIENT
Start: 2018-12-15 | End: 2018-12-18

## 2018-12-15 RX ORDER — ATORVASTATIN CALCIUM 80 MG/1
20 TABLET, FILM COATED ORAL AT BEDTIME
Qty: 0 | Refills: 0 | Status: DISCONTINUED | OUTPATIENT
Start: 2018-12-15 | End: 2018-12-18

## 2018-12-15 RX ORDER — ACETAMINOPHEN 500 MG
650 TABLET ORAL EVERY 6 HOURS
Qty: 0 | Refills: 0 | Status: DISCONTINUED | OUTPATIENT
Start: 2018-12-15 | End: 2018-12-18

## 2018-12-15 RX ORDER — METOCLOPRAMIDE HCL 10 MG
1 TABLET ORAL
Qty: 0 | Refills: 0 | COMMUNITY

## 2018-12-15 RX ORDER — MAGNESIUM SULFATE 500 MG/ML
1 VIAL (ML) INJECTION ONCE
Qty: 0 | Refills: 0 | Status: COMPLETED | OUTPATIENT
Start: 2018-12-15 | End: 2018-12-15

## 2018-12-15 RX ORDER — CLONAZEPAM 1 MG
0.5 TABLET ORAL
Qty: 0 | Refills: 0 | Status: DISCONTINUED | OUTPATIENT
Start: 2018-12-15 | End: 2018-12-15

## 2018-12-15 RX ORDER — FAMOTIDINE 10 MG/ML
40 INJECTION INTRAVENOUS AT BEDTIME
Qty: 0 | Refills: 0 | Status: DISCONTINUED | OUTPATIENT
Start: 2018-12-15 | End: 2018-12-18

## 2018-12-15 RX ORDER — LORATADINE 10 MG/1
10 TABLET ORAL DAILY
Qty: 0 | Refills: 0 | Status: DISCONTINUED | OUTPATIENT
Start: 2018-12-15 | End: 2018-12-15

## 2018-12-15 RX ORDER — SODIUM CHLORIDE 9 MG/ML
1000 INJECTION INTRAMUSCULAR; INTRAVENOUS; SUBCUTANEOUS
Qty: 0 | Refills: 0 | Status: DISCONTINUED | OUTPATIENT
Start: 2018-12-15 | End: 2018-12-15

## 2018-12-15 RX ORDER — METOPROLOL TARTRATE 50 MG
50 TABLET ORAL DAILY
Qty: 0 | Refills: 0 | Status: DISCONTINUED | OUTPATIENT
Start: 2018-12-15 | End: 2018-12-18

## 2018-12-15 RX ORDER — FOLIC ACID 0.8 MG
1 TABLET ORAL DAILY
Qty: 0 | Refills: 0 | Status: DISCONTINUED | OUTPATIENT
Start: 2018-12-15 | End: 2018-12-18

## 2018-12-15 RX ORDER — LEVOTHYROXINE SODIUM 125 MCG
100 TABLET ORAL DAILY
Qty: 0 | Refills: 0 | Status: DISCONTINUED | OUTPATIENT
Start: 2018-12-15 | End: 2018-12-18

## 2018-12-15 RX ORDER — PREGABALIN 225 MG/1
1000 CAPSULE ORAL DAILY
Qty: 0 | Refills: 0 | Status: DISCONTINUED | OUTPATIENT
Start: 2018-12-15 | End: 2018-12-18

## 2018-12-15 RX ORDER — CHOLECALCIFEROL (VITAMIN D3) 125 MCG
1000 CAPSULE ORAL DAILY
Qty: 0 | Refills: 0 | Status: DISCONTINUED | OUTPATIENT
Start: 2018-12-15 | End: 2018-12-18

## 2018-12-15 RX ORDER — METOCLOPRAMIDE HCL 10 MG
10 TABLET ORAL ONCE
Qty: 0 | Refills: 0 | Status: COMPLETED | OUTPATIENT
Start: 2018-12-15 | End: 2018-12-15

## 2018-12-15 RX ORDER — LORATADINE 10 MG/1
1 TABLET ORAL
Qty: 0 | Refills: 0 | COMMUNITY

## 2018-12-15 RX ADMIN — Medication 650 MILLIGRAM(S): at 22:15

## 2018-12-15 RX ADMIN — HEPARIN SODIUM 5000 UNIT(S): 5000 INJECTION INTRAVENOUS; SUBCUTANEOUS at 21:35

## 2018-12-15 RX ADMIN — Medication 1 MILLIGRAM(S): at 12:13

## 2018-12-15 RX ADMIN — ATORVASTATIN CALCIUM 20 MILLIGRAM(S): 80 TABLET, FILM COATED ORAL at 21:35

## 2018-12-15 RX ADMIN — SODIUM CHLORIDE 75 MILLILITER(S): 9 INJECTION INTRAMUSCULAR; INTRAVENOUS; SUBCUTANEOUS at 02:38

## 2018-12-15 RX ADMIN — Medication 100 MICROGRAM(S): at 06:20

## 2018-12-15 RX ADMIN — FAMOTIDINE 40 MILLIGRAM(S): 10 INJECTION INTRAVENOUS at 21:35

## 2018-12-15 RX ADMIN — LORATADINE 10 MILLIGRAM(S): 10 TABLET ORAL at 12:13

## 2018-12-15 RX ADMIN — Medication 10 MILLIGRAM(S): at 18:04

## 2018-12-15 RX ADMIN — Medication 650 MILLIGRAM(S): at 21:35

## 2018-12-15 RX ADMIN — Medication 50 MILLIGRAM(S): at 16:32

## 2018-12-15 RX ADMIN — HEPARIN SODIUM 5000 UNIT(S): 5000 INJECTION INTRAVENOUS; SUBCUTANEOUS at 06:20

## 2018-12-15 RX ADMIN — Medication 100 GRAM(S): at 01:02

## 2018-12-15 RX ADMIN — Medication 1000 UNIT(S): at 16:32

## 2018-12-15 RX ADMIN — PREGABALIN 1000 MICROGRAM(S): 225 CAPSULE ORAL at 12:13

## 2018-12-15 RX ADMIN — HEPARIN SODIUM 5000 UNIT(S): 5000 INJECTION INTRAVENOUS; SUBCUTANEOUS at 14:20

## 2018-12-15 RX ADMIN — Medication 650 MILLIGRAM(S): at 15:15

## 2018-12-15 RX ADMIN — Medication 0.5 MILLIGRAM(S): at 12:13

## 2018-12-15 RX ADMIN — Medication 650 MILLIGRAM(S): at 14:20

## 2018-12-15 NOTE — PROGRESS NOTE ADULT - PROBLEM SELECTOR PLAN 2
- Na 127 on admission , given 1L of NS in the ED  - will follow up Na on the AM BMP   - given clinical history of nausea, vomiting, and diarrhea, likely hypovolemic hyponatremia - Na 127 on admission , given 1L of NS in the ED, improve to 134  - Likely hypovolemic hyponatremia, encourage PO intake  - Monitor bmp ad

## 2018-12-15 NOTE — PATIENT PROFILE ADULT - FALL HARM RISK TYPE OF ASSESSMENT
Blood sugar and 90 day average sugar ordered  Will monitor- known blood sugar elevation - should have a1c q 3 years if normal today    admission

## 2018-12-15 NOTE — H&P ADULT - NSHPPHYSICALEXAM_GEN_ALL_CORE
Vital Signs Last 24 Hrs  T(C): 36.6 (14 Dec 2018 22:06), Max: 36.8 (14 Dec 2018 20:32)  T(F): 97.9 (14 Dec 2018 22:06), Max: 98.3 (14 Dec 2018 20:32)  HR: 75 (14 Dec 2018 22:06) (68 - 80)  BP: 153/80 (14 Dec 2018 22:06) (153/80 - 161/86)  BP(mean): --  RR: 16 (14 Dec 2018 22:06) (16 - 18)  SpO2: 100% (14 Dec 2018 22:06) (100% - 100%)    PHYSICAL EXAM:    GENERAL: sleeping in bed comfortably, NAD  HEAD:  Normocephalic, atraumatic  EYES: EOMI, PERRLA  HEENT: Moist mucous membranes  NECK: Supple, No JVD  NERVOUS SYSTEM:  AAOx3, moving all 4 extremities, decreased sensation to light touch on lateral aspect of left leg compared to the right  CHEST/LUNG: Clear to auscultation bilaterally  HEART: Regular rate and rhythm, no murmur   ABDOMEN: +BS, soft, TTP in RLQ   EXTREMITIES:  No clubbing, cyanosis, or edema  MUSCULOSKELETAL: No muscle tenderness, no joint tenderness  SKIN: warm and dry, no rash Vital Signs Last 24 Hrs  T(C): 36.6 (14 Dec 2018 22:06), Max: 36.8 (14 Dec 2018 20:32)  T(F): 97.9 (14 Dec 2018 22:06), Max: 98.3 (14 Dec 2018 20:32)  HR: 75 (14 Dec 2018 22:06) (68 - 80)  BP: 153/80 (14 Dec 2018 22:06) (153/80 - 161/86)  BP(mean): --  RR: 16 (14 Dec 2018 22:06) (16 - 18)  SpO2: 100% (14 Dec 2018 22:06) (100% - 100%)    PHYSICAL EXAM:    GENERAL: sleeping in bed comfortably, NAD  EYES: EOMI, PERRL  HEENT: dry mucous membranes, normal hearing  NECK: Supple, No JVD  NERVOUS SYSTEM:  AAOx3, moving all 4 extremities, decreased sensation to light touch on lateral aspect of left leg compared to the right  CHEST/LUNG: Clear to auscultation bilaterally, normal respiratory effort  HEART: Regular rate and rhythm, no murmur   ABDOMEN: +BS, soft, TTP in RLQ   EXTREMITIES:  No clubbing, cyanosis, or edema  MUSCULOSKELETAL: No muscle tenderness, no joint tenderness, joint swelling  SKIN: warm and dry, no rash

## 2018-12-15 NOTE — H&P ADULT - NSHPREVIEWOFSYSTEMS_GEN_ALL_CORE
REVIEW OF SYSTEMS    CONSTITUTIONAL:  Denies fevers, + chills  HEENT: Denies vision changes or nasal congestion   SKIN:  Denies rash or itching.  CARDIOVASCULAR:  Denies chest pain, MOIZ  RESPIRATORY:  Denies shortness of breath, cough or sputum.  GASTROINTESTINAL:  + nausea, vomiting, abdominal pain  GENITOURINARY:  + dysuria  NEUROLOGICAL:  + headache, dizziness, numbness and tingling in the LLE  MUSCULOSKELETAL:  + back pain  HEMATOLOGIC:  Denies anemia, bleeding or bruising.  LYMPHATICS:  Denies enlarged nodes.   PSYCHIATRIC:  Denies history of depression or anxiety.  ENDOCRINOLOGIC:  Denies reports of sweating, cold or heat intolerance. No polyuria or polydipsia.  ALLERGIES:  Denies history of asthma, hives, eczema or rhinitis. CONSTITUTIONAL:  Denies fevers, + chills  HEENT: Denies vision changes, eye pain, nasal congestion, sinus pain  SKIN:  Denies rash or itching.  CARDIOVASCULAR:  Denies chest pain, palpitations  RESPIRATORY:  Denies shortness of breath, cough or sputum.  GASTROINTESTINAL:  + nausea, vomiting, abdominal pain  GENITOURINARY:  + dysuria, no hematuria  NEUROLOGICAL:  + headache, dizziness, numbness and tingling in the LLE  MUSCULOSKELETAL:  + back pain, no joint swelling  HEMATOLOGIC:  Denies anemia, bleeding or bruising

## 2018-12-15 NOTE — H&P ADULT - HISTORY OF PRESENT ILLNESS
This is a 59 y/o F with a PMH significant for CVA (4 years ago), HTN, chronic headache, and GERD who presents to the ED with weakness, dizziness, and vomiting. The patient has multiple chronic complaints but most acutely had 3 episodes of watery stools yesterday evening. This morning, she was complaining of weakness and worsening dizziness (felt like the room was spinning) and had 6 episodes of non bilious non bloody vomiting. She has no appetite and has not eaten anything today. She denies eating any new foods, recent travel, or sick contacts. Per the daughter, her mother's blood pressure was elevated to the 190s and therefore they called the PMD Dr. Quinones who suggested she come to the ED for further evaluation. She also complains of generalized abdominal pain and per the daughter she has chronic gas pains in her belly but she felt it was worse today. Also endorsing left sides numbness and tingling for the last month. Per the daughter, the patient had an MRI done outpatient 3 months ago and it was normal. Also endorsing some dysuria for the last few days. The patient has been taking clonazepam 0.5mg BID for the last few weeks to help with anxiety but in the last week the patient self discontinued the medication because she felt it was making her more dizzy.     In the ED, the patient has been afebrile, BP: 153//86, HR: 68-75, RR: 18 and sating 100% on room air. She was given 1 L of normal saline along with tylenol and reglan for symptoms. CT head was negative for acute pathology and CT abdomen and pelvis showed mild colitis. This is a 59 y/o F with a PMH significant for CVA (4 years ago), HTN, chronic headache, and GERD who presents to the ED with weakness, dizziness, and vomiting. The patient has multiple chronic complaints but most acutely had 3 episodes of watery stools yesterday evening. This morning, she was complaining of weakness and worsening dizziness (felt like the room was spinning) and had 6 episodes of non bilious non bloody vomiting. She has no appetite and has not eaten anything today. She denies eating any new foods, recent travel, or sick contacts. Per the daughter, her mother's blood pressure was elevated to the 190s and therefore they called the PMD Dr. Quinones who suggested she come to the ED for further evaluation. She also complains of generalized abdominal pain and per the daughter she has chronic gas pains in her belly but she felt it was worse today. Also endorsing left sides numbness and tingling for the last month. Per the daughter, the patient had an MRI done outpatient 3 months ago and it was normal. Also endorsing some dysuria for the last few days. The patient has been taking clonazepam 0.5mg BID for the last few weeks to help with anxiety but in the last week the patient self discontinued the medication because she felt it was making her more dizzy.     In the ED, the patient has been afebrile, BP: 153//86, HR: 68-75, RR: 18 and sating 100% on room air. She was given 1 L of normal saline along with tylenol and reglan for symptoms. CT head was negative for acute pathology and CT abdomen and pelvis showed mild colitis.       No family history pertinent to patient’s current condition/encounter

## 2018-12-15 NOTE — PROGRESS NOTE ADULT - PROBLEM SELECTOR PLAN 4
- dizziness appears to be a chronic issue for the patient and she follows with her PMD  - may be acutely worsened secondary to acute GI illness vs acute clonazepam withdrawal as patient was previously taking the medication BID daily and suddenly stopped in 1 week ago (has nausea headache, stomach pain, dizziness)  - will restart clonazepam 0.5mg daily to begin titrating off  - CT head negative for any acute pathology   - will order orthostatics for further workup - dizziness appears to be a chronic issue for the patient and she follows with her PMD  - May be acutely worsened secondary to acute GI illness vs acute clonazepam withdrawal as patient was previously taking the medication BID daily and suddenly stopped in 1 week ago (has nausea headache, stomach pain, dizziness)  - C/w clonazepam 0.5mg daily to begin titrating off  - CT head negative for any acute pathology   - Consider MRI w/ cont of head if dizziness not improving, concern for infarct given hx of CVA

## 2018-12-15 NOTE — CONSULT NOTE ADULT - PROBLEM SELECTOR RECOMMENDATION 3
Pt with acute on chronic hyponatremia  Likely sec to hypovolumi chyponatremia  Serum sodium improving with IVF  Monitor serum sodium   Avoid overcorrection >8 meQ in 24 hrs

## 2018-12-15 NOTE — PROGRESS NOTE ADULT - PROBLEM SELECTOR PLAN 5
- per daughter, patient with BP of 190s at home, has been in the 150s-160s systolic while inpatient   - only has metoprolol as a BP med but daughter thinks she forgot a medication at home --> will need to clarify with the pharmacy what her other blood pressure medications are - Reported to have SBP in 190 at home. Currently ranging from 130-150s  - C/w home metoprolol

## 2018-12-15 NOTE — H&P ADULT - PROBLEM SELECTOR PLAN 8
- DVT ppx  - DASH diet  - Dispo pending resolution of symptoms    Nikki Negrete  PGY 2 Night Admit  Pager [phone]

## 2018-12-15 NOTE — CONSULT NOTE ADULT - SUBJECTIVE AND OBJECTIVE BOX
[facility]   MD GAB REARDON MD RUORU WONG, PA    TEL:  OFFICE: [phone]  DR BURTON CELL: [phone]  ALLI SIN CELL: [phone]  DR. SHANE CELL: [phone]    -- INITIAL RENAL CONSULT NOTE  --------------------------------------------------------------------------------  HPI:  This is a 59 y/o F with a PMH significant for CVA (4 years ago), HTN, chronic headache, and GERD who is admitted for colitis complicated by hyponatremia.   PT known to nephrology, follows with Dr. Burton in renal clinic for CKD. Pt was last seen in office on 7/11/2018 , last Scr was 1.3.       PAST HISTORY  --------------------------------------------------------------------------------  PAST MEDICAL & SURGICAL HISTORY:  CVA (cerebral vascular accident)  GERD (gastroesophageal reflux disease)  Hypertension, unspecified type  H/O abdominal hysterectomy  History of cholecystectomy    FAMILY HISTORY:  No pertinent family history in first degree relatives    PAST SOCIAL HISTORY:    ALLERGIES & MEDICATIONS  --------------------------------------------------------------------------------  Allergies    No Known Allergies    Intolerances      Standing Inpatient Medications  atorvastatin 20 milliGRAM(s) Oral at bedtime  clonazePAM Tablet 0.5 milliGRAM(s) Oral daily  cyanocobalamin 1000 MICROGram(s) Oral daily  famotidine    Tablet 40 milliGRAM(s) Oral at bedtime  folic acid 1 milliGRAM(s) Oral daily  heparin  Injectable 5000 Unit(s) SubCutaneous every 8 hours  levothyroxine 100 MICROGram(s) Oral daily  loratadine 10 milliGRAM(s) Oral daily    PRN Inpatient Medications  acetaminophen   Tablet .. 650 milliGRAM(s) Oral every 6 hours PRN  metoclopramide Injectable 10 milliGRAM(s) IV Push once PRN      REVIEW OF SYSTEMS  --------------------------------------------------------------------------------  Gen: No fevers/chills  Skin: No rashes  Head/Eyes/Ears: Normal hearing,  Normal vision   Respiratory: No dyspnea, cough  CV: No chest pain  GI: No abdominal pain, + diarrhea  : No dysuria, hematuria  MSK: No  edema  Heme: No easy bruising or bleeding  Psych: No significant depression    All other systems were reviewed and are negative, except as noted.    VITALS/PHYSICAL EXAM  --------------------------------------------------------------------------------  T(C): 36.7 (12-15-18 @ 06:11), Max: 36.8 (12-14-18 @ 20:32)  HR: 73 (12-15-18 @ 06:11) (60 - 80)  BP: 131/78 (12-15-18 @ 06:11) (131/78 - 161/86)  RR: 17 (12-15-18 @ 06:11) (16 - 18)  SpO2: 99% (12-15-18 @ 06:11) (99% - 100%)  Wt(kg): --  Height (cm): 180.34 (12-15-18 @ 02:22)  Weight (kg): 59.4 (12-15-18 @ 02:22)  BMI (kg/m2): 18.3 (12-15-18 @ 02:22)  BSA (m2): 1.76 (12-15-18 @ 02:22)      Physical Exam:  	Gen: NAD  	HEENT: MMM  	Pulm: CTA B/L  	CV: S1S2  	Abd: Soft, +BS   	Ext: No LE edema B/L  	Neuro: Awake  	Skin: Warm and dry  	Gu no murguia    LABS/STUDIES  --------------------------------------------------------------------------------              12.0   10.38 >-----------<  217      [12-14-18 @ 20:25]              36.4     134  |  102  |  12  ----------------------------<  95      [12-15-18 @ 06:15]  4.5   |  21  |  1.46        Ca     8.9     [12-15-18 @ 06:15]      Mg     2.0     [12-15-18 @ 06:15]      Phos  3.9     [12-15-18 @ 06:15]    TPro  7.8  /  Alb  4.4  /  TBili  0.4  /  DBili  x   /  AST  25  /  ALT  23  /  AlkPhos  87  [12-14-18 @ 20:25]    PT/INR: PT 10.7 , INR 0.97       [12-14-18 @ 20:25]  PTT: 30.9       [12-14-18 @ 20:25]    Serum Osmolality 270      [12-14-18 @ 20:25]    Creatinine Trend:  SCr 1.46 [12-15 @ 06:15]  SCr 1.40 [12-14 @ 20:25]  SCr 1.45 [11-24 @ 17:31]    Urinalysis - [12-14-18 @ 18:38]      Color COLORLESS / Appearance CLEAR / SG 1.006 / pH 6.0      Gluc NEGATIVE / Ketone NEGATIVE  / Bili NEGATIVE / Urobili NORMAL       Blood NEGATIVE / Protein NEGATIVE / Leuk Est TRACE / Nitrite NEGATIVE      RBC 0-2 / WBC 3-5 / Hyaline NEGATIVE / Gran  / Sq Epi OCC / Non Sq Epi  / Bacteria NEGATIVE    Urine Creatinine 23.10      [12-14-18 @ 23:57]  Urine Sodium 56      [12-14-18 @ 23:57]  Urine Potassium 14.7      [12-14-18 @ 23:57]  Urine Chloride 59      [12-14-18 @ 23:57]  Urine Osmolality 191      [12-14-18 @ 23:57]

## 2018-12-15 NOTE — PROGRESS NOTE ADULT - PROBLEM SELECTOR PLAN 3
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 appreciate recs.

## 2018-12-15 NOTE — H&P ADULT - PROBLEM SELECTOR PLAN 1
- patient with 2 episodes of diarrhea yesterday, N/V, and abdominal pain with CT findings suggestive of mild colitis  - patient afebrile with no leukocytosis, will monitor off antibiotics for now  - continue supportive care with IV fluids, electrolyte repletion, and PRN reglan (monitor QTc)  - GI PCR and stool studies

## 2018-12-15 NOTE — H&P ADULT - NSHPSOCIALHISTORY_GEN_ALL_CORE
The patient lives at home with her daughter. She ambulates with a cane or with the help of her daughter. She denies any tobacco, alcohol, or recreational drug use.

## 2018-12-15 NOTE — PROGRESS NOTE ADULT - ASSESSMENT
59 y/o F with a PMH significant for CVA (4 years ago), HTN, chronic headache, and GERD who is admitted for colitis complicated by hyponatremia.

## 2018-12-15 NOTE — H&P ADULT - ASSESSMENT
This is a 57 y/o F with a PMH significant for CVA (4 years ago), HTN, chronic headache, and GERD who is admitted for colitis complicated by hyponatremia.

## 2018-12-15 NOTE — H&P ADULT - NSHPLABSRESULTS_GEN_ALL_CORE
LABORATORY                            12.0   10.38 )-----------( 217      ( 14 Dec 2018 20:25 )             36.4       12-14    127<L>  |  94<L>  |  13  ----------------------------<  101<H>  4.7   |  20<L>  |  1.40<H>    Ca    9.1      14 Dec 2018 20:25  Phos  3.0     12-  Mg     1.3     12-14    TPro  7.8  /  Alb  4.4  /  TBili  0.4  /  DBili  x   /  AST  25  /  ALT  23  /  AlkPhos  87  12-      Urinalysis Basic - ( 14 Dec 2018 18:38 )    Color: COLORLESS / Appearance: CLEAR / S.006 / pH: 6.0  Gluc: NEGATIVE / Ketone: NEGATIVE  / Bili: NEGATIVE / Urobili: NORMAL   Blood: NEGATIVE / Protein: NEGATIVE / Nitrite: NEGATIVE   Leuk Esterase: TRACE / RBC: 0-2 / WBC 3-5   Sq Epi: OCC / Non Sq Epi: x / Bacteria: NEGATIVE    EKG: NSR 76bpm, no ST or T wave inversions, QTc 427       RADIOLOGY    < from: CT Head No Cont (18 @ 21:48) >      IMPRESSION:   No CT evidence of acute intracranial hemorrhage, mass effect or large   vascular territory distribution infarct allowing for the limitation of   the study. If clinically indicated, short-term follow-up or MRI may be   obtained for further evaluation provided no MR contraindications.      < end of copied text >    < from: CT Abdomen and Pelvis w/ Oral Cont and w/ IV Cont (18 @ 21:53) >    IMPRESSION:     Normal appendix.    Wall thickening of distal descending and proximal transverse colonic   loops without significant pericolonic stranding. The findings are   nonspecific, this may be on the basis of inadequate distention or   represent mild colitis in appropriate clinical setting. Consider   nonemergent endoscopic evaluation if there is concern for malignancy. No   bowel obstruction or extraluminal collection.     < end of copied text >

## 2018-12-15 NOTE — H&P ADULT - PROBLEM SELECTOR PLAN 4
- dizziness appears to be a chronic issue for the patient and she follows with her PMD  - may be acutely worsened secondary to acute GI illness vs acute clonazepam withdrawal as patient was previously taking the medication BID daily and suddenly stopped in 1 week ago (has nausea headache, stomach pain, dizziness)  - will restart home clonazepam dose   - CT head negative for any acute pathology   - will order orthostatics for further workup - dizziness appears to be a chronic issue for the patient and she follows with her PMD  - may be acutely worsened secondary to acute GI illness vs acute clonazepam withdrawal as patient was previously taking the medication BID daily and suddenly stopped in 1 week ago (has nausea headache, stomach pain, dizziness)  - will restart clonazepam 0.5mg daily to begin titrating off  - CT head negative for any acute pathology   - will order orthostatics for further workup

## 2018-12-15 NOTE — PROGRESS NOTE ADULT - PROBLEM SELECTOR PLAN 1
- 2 episodes of diarrhea yesterday, N/V, and abdominal pain with CT findings suggestive of mild colitis  - Monitor off antibiotics for now as patient afebrile, no leukocytosis.   - Supportive care with IV fluids, electrolyte repletion, and PRN reglan (monitor QTc)  - GI PCR and stool studies - 2 episodes of diarrhea yesterday, N/V, and abdominal pain with CT findings suggestive of mild colitis  - Monitor off antibiotics for now as patient afebrile, no leukocytosis.   - Supportive care with IV fluids, electrolyte repletion, and PRN reglan (monitor QTc)  - Send GI PCR and stool studies

## 2018-12-15 NOTE — PROGRESS NOTE ADULT - SUBJECTIVE AND OBJECTIVE BOX
Contact Info:  Jw Butcher M.D., PGY-2  Pager: ns- [phone], JGB- [phone]      Chief Complaint: Patient is a 58y old  Female who presents with a chief complaint of Hyponatremia, weakness (15 Dec 2018 01:23)        INTERVAL HPI/OVERNIGHT EVENTS:   No acute overnight event. Patient reports feeling_____. Denied fever, chill, nausea, vomiting, headache, CP, SOB, abdominal pain, diarrhea, or constipation.       MEDICATIONS  (STANDING):  atorvastatin 20 milliGRAM(s) Oral at bedtime  clonazePAM Tablet 0.5 milliGRAM(s) Oral daily  cyanocobalamin 1000 MICROGram(s) Oral daily  famotidine    Tablet 40 milliGRAM(s) Oral at bedtime  folic acid 1 milliGRAM(s) Oral daily  heparin  Injectable 5000 Unit(s) SubCutaneous every 8 hours  levothyroxine 100 MICROGram(s) Oral daily  loratadine 10 milliGRAM(s) Oral daily    MEDICATIONS  (PRN):  acetaminophen   Tablet .. 650 milliGRAM(s) Oral every 6 hours PRN Severe Pain (7 - 10)  metoclopramide Injectable 10 milliGRAM(s) IV Push once PRN nausea/vomiting      Vital Signs Last 24 Hrs  T(C): 36.7 (15 Dec 2018 06:11), Max: 36.8 (14 Dec 2018 20:32)  T(F): 98 (15 Dec 2018 06:11), Max: 98.3 (14 Dec 2018 20:32)  HR: 73 (15 Dec 2018 06:11) (60 - 80)  BP: 131/78 (15 Dec 2018 06:11) (131/78 - 161/86)  BP(mean): --  RR: 17 (15 Dec 2018 06:11) (16 - 18)  SpO2: 99% (15 Dec 2018 06:11) (99% - 100%)  Supplemental O2: [ ] No, on Room Air [ ] Yes,     I&O's Detail    CAPILLARY BLOOD GLUCOSE          PHYSICAL EXAM:  Daily Height in cm: 180.34 (15 Dec 2018 02:22)    Daily    Appearance: NAD, well-developed	  HEENT:   Normal oral mucosa, PERRL, EOMI	  Neck: No JVD, no LAD, supple, trachea in midline  Cardiovascular: Normal S1 S2, No JVD, No murmurs  Respiratory: Lungs clear to auscultation, no wheezing, rhonchi  Gastrointestinal:  Soft, Non-tender, nondistended, + BS  Skin: No rashes, No ecchymoses, No cyanosis	  MSK/Extremities: Normal range of motion, 5/5 Muscle strength bilaterally. No clubbing, cyanosis or edema  Vascular: Peripheral pulses palpable 2+ bilaterally  Neurologic: Non-focal, CN II-XII intact  Psychiatry: A & O x 3, Mood & affect appropriate    LABS:                        12.0   10.38 )-----------( 217      ( 14 Dec 2018 20:25 )             36.4     12-15    134<L>  |  102  |  12  ----------------------------<  95  4.5   |  21<L>  |  1.46<H>    Ca    8.9      15 Dec 2018 06:15  Phos  3.9     12-15  Mg     2.0     12-15    TPro  7.8  /  Alb  4.4  /  TBili  0.4  /  DBili  x   /  AST  25  /  ALT  23  /  AlkPhos  87  12-14    LIVER FUNCTIONS - ( 14 Dec 2018 20:25 )  Alb: 4.4 g/dL / Pro: 7.8 g/dL / ALK PHOS: 87 u/L / ALT: 23 u/L / AST: 25 u/L / GGT: x     / T. Bili 0.4 mg/dL / D. Bili x         PT/INR - ( 14 Dec 2018 20:25 )   PT: 10.7 SEC;   INR: 0.97          PTT - ( 14 Dec 2018 20:25 )  PTT:30.9 SEC  Urinalysis Basic - ( 14 Dec 2018 18:38 )    Color: COLORLESS / Appearance: CLEAR / S.006 / pH: 6.0  Gluc: NEGATIVE / Ketone: NEGATIVE  / Bili: NEGATIVE / Urobili: NORMAL   Blood: NEGATIVE / Protein: NEGATIVE / Nitrite: NEGATIVE   Leuk Esterase: TRACE / RBC: 0-2 / WBC 3-5   Sq Epi: OCC / Non Sq Epi: x / Bacteria: NEGATIVE            Urinalysis Basic - ( 14 Dec 2018 18:38 )    Color: COLORLESS / Appearance: CLEAR / S.006 / pH: 6.0  Gluc: NEGATIVE / Ketone: NEGATIVE  / Bili: NEGATIVE / Urobili: NORMAL   Blood: NEGATIVE / Protein: NEGATIVE / Nitrite: NEGATIVE   Leuk Esterase: TRACE / RBC: 0-2 / WBC 3-5   Sq Epi: OCC / Non Sq Epi: x / Bacteria: NEGATIVE      Microbiology:    RADIOLOGY & ADDITIONAL TESTS:    Xray -   CT -  MRI -     Imaging Personally Reviewed:  [ ] YES  [ ] NO  Consultant(s) Notes Reviewed:  [X] YES  [ ] NO  Care Discussed with Consultants/Other Providers [ ] YES  [ ] NO Contact Info:  Jw Butcher M.D., PGY-2  Pager: ns- [phone], U[facility]- [phone]      Chief Complaint: Patient is a 58y old  Female who presents with a chief complaint of Hyponatremia, weakness (15 Dec 2018 01:23)        INTERVAL HPI/OVERNIGHT EVENTS:   No acute overnight event. Daughter at bedside providing translation. Patient reports feeling better. Reports headache but denied fever, chill, dizziness, nausea, vomiting, CP, SOB, abdominal pain, diarrhea, or constipation.       MEDICATIONS  (STANDING):  atorvastatin 20 milliGRAM(s) Oral at bedtime  clonazePAM Tablet 0.5 milliGRAM(s) Oral daily  cyanocobalamin 1000 MICROGram(s) Oral daily  famotidine    Tablet 40 milliGRAM(s) Oral at bedtime  folic acid 1 milliGRAM(s) Oral daily  heparin  Injectable 5000 Unit(s) SubCutaneous every 8 hours  levothyroxine 100 MICROGram(s) Oral daily  loratadine 10 milliGRAM(s) Oral daily    MEDICATIONS  (PRN):  acetaminophen   Tablet .. 650 milliGRAM(s) Oral every 6 hours PRN Severe Pain (7 - 10)  metoclopramide Injectable 10 milliGRAM(s) IV Push once PRN nausea/vomiting      Vital Signs Last 24 Hrs  T(C): 36.7 (15 Dec 2018 06:11), Max: 36.8 (14 Dec 2018 20:32)  T(F): 98 (15 Dec 2018 06:11), Max: 98.3 (14 Dec 2018 20:32)  HR: 73 (15 Dec 2018 06:11) (60 - 80)  BP: 131/78 (15 Dec 2018 06:11) (131/78 - 161/86)  BP(mean): --  RR: 17 (15 Dec 2018 06:11) (16 - 18)  SpO2: 99% (15 Dec 2018 06:11) (99% - 100%)  Supplemental O2: [ ] No, on Room Air [ ] Yes,     I&O's Detail    CAPILLARY BLOOD GLUCOSE          PHYSICAL EXAM:  GENERAL: NAD, well developed  EYES: EOMI, PERRL  HEENT: dry mucous membranes, normal hearing  NECK: Supple, No JVD  NERVOUS SYSTEM:  AAOx3, moving all 4 extremities, no focal motor or sensory deficit.   CHEST/LUNG: Clear to auscultation bilaterally, normal respiratory effort  HEART: Regular rate and rhythm, no murmur   ABDOMEN: +BS, soft, TTP in RLQ   EXTREMITIES:  No clubbing, cyanosis, or edema  MUSCULOSKELETAL: No muscle tenderness, no joint tenderness, joint swelling  SKIN: warm and dry, no rash    LABS:                        12.0   10.38 )-----------( 217      ( 14 Dec 2018 20:25 )             36.4     12-15    134<L>  |  102  |  12  ----------------------------<  95  4.5   |  21<L>  |  1.46<H>    Ca    8.9      15 Dec 2018 06:15  Phos  3.9     12-15  Mg     2.0     12-15    TPro  7.8  /  Alb  4.4  /  TBili  0.4  /  DBili  x   /  AST  25  /  ALT  23  /  AlkPhos  87  12-14    LIVER FUNCTIONS - ( 14 Dec 2018 20:25 )  Alb: 4.4 g/dL / Pro: 7.8 g/dL / ALK PHOS: 87 u/L / ALT: 23 u/L / AST: 25 u/L / GGT: x     / T. Bili 0.4 mg/dL / D. Bili x         PT/INR - ( 14 Dec 2018 20:25 )   PT: 10.7 SEC;   INR: 0.97          PTT - ( 14 Dec 2018 20:25 )  PTT:30.9 SEC  Urinalysis Basic - ( 14 Dec 2018 18:38 )    Color: COLORLESS / Appearance: CLEAR / S.006 / pH: 6.0  Gluc: NEGATIVE / Ketone: NEGATIVE  / Bili: NEGATIVE / Urobili: NORMAL   Blood: NEGATIVE / Protein: NEGATIVE / Nitrite: NEGATIVE   Leuk Esterase: TRACE / RBC: 0-2 / WBC 3-5   Sq Epi: OCC / Non Sq Epi: x / Bacteria: NEGATIVE            Urinalysis Basic - ( 14 Dec 2018 18:38 )    Color: COLORLESS / Appearance: CLEAR / S.006 / pH: 6.0  Gluc: NEGATIVE / Ketone: NEGATIVE  / Bili: NEGATIVE / Urobili: NORMAL   Blood: NEGATIVE / Protein: NEGATIVE / Nitrite: NEGATIVE   Leuk Esterase: TRACE / RBC: 0-2 / WBC 3-5   Sq Epi: OCC / Non Sq Epi: x / Bacteria: NEGATIVE      Imaging Personally Reviewed:  [ ] YES  [ ] NO  Consultant(s) Notes Reviewed:  [X] YES  [ ] NO  Care Discussed with Consultants/Other Providers [ ] YES  [ ] NO

## 2018-12-15 NOTE — CONSULT NOTE ADULT - PROBLEM SELECTOR RECOMMENDATION 9
Pt with CKD  Baseline Sc 1.3   Renal function stable today   UA with no protein no RBC  Monitor BMP   Avoid nephrotoxics, NSAIDs RCA

## 2018-12-15 NOTE — H&P ADULT - PROBLEM SELECTOR PLAN 2
- Na 127 on admission , given 1L of NS in the ED  - will follow up Na on the AM BMP   - given clinical history of nausea, vomiting, and diarrhea, likely hypovolemic hyponatremia

## 2018-12-15 NOTE — H&P ADULT - PROBLEM SELECTOR PLAN 5
- per daughter, patient with BP of 190s at home, has been in the 150s-160s systolic while inpatient   - only has metorpolol as a BP med but daughter thinks she forgot a medication at home --> will need to clarify with the pharmacy what her other blood pressure medications are - per daughter, patient with BP of 190s at home, has been in the 150s-160s systolic while inpatient   - only has metoprolol as a BP med but daughter thinks she forgot a medication at home --> will need to clarify with the pharmacy what her other blood pressure medications are

## 2018-12-15 NOTE — H&P ADULT - PROBLEM SELECTOR PLAN 3
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

## 2018-12-15 NOTE — PROGRESS NOTE ADULT - PROBLEM SELECTOR PLAN 8
- DVT ppx  - DASH diet  - Dispo pending resolution of symptoms    Nikki Negrete  PGY 2 Night Admit  Pager [phone] - DVT ppx  - DASH diet  - Dispo pending resolution of symptom - DVT ppx  - DASH diet  - Dispo pending resolution of symptom, PT eval

## 2018-12-16 DIAGNOSIS — E87.2 ACIDOSIS: ICD-10-CM

## 2018-12-16 LAB
BACTERIA UR CULT: SIGNIFICANT CHANGE UP
BUN SERPL-MCNC: 13 MG/DL — SIGNIFICANT CHANGE UP (ref 7–23)
CALCIUM SERPL-MCNC: 8.8 MG/DL — SIGNIFICANT CHANGE UP (ref 8.4–10.5)
CHLORIDE SERPL-SCNC: 96 MMOL/L — LOW (ref 98–107)
CO2 SERPL-SCNC: 18 MMOL/L — LOW (ref 22–31)
CREAT SERPL-MCNC: 1.44 MG/DL — HIGH (ref 0.5–1.3)
GLUCOSE SERPL-MCNC: 94 MG/DL — SIGNIFICANT CHANGE UP (ref 70–99)
HCT VFR BLD CALC: 34.8 % — SIGNIFICANT CHANGE UP (ref 34.5–45)
HGB BLD-MCNC: 11.3 G/DL — LOW (ref 11.5–15.5)
MAGNESIUM SERPL-MCNC: 1.7 MG/DL — SIGNIFICANT CHANGE UP (ref 1.6–2.6)
MCHC RBC-ENTMCNC: 26.7 PG — LOW (ref 27–34)
MCHC RBC-ENTMCNC: 32.5 % — SIGNIFICANT CHANGE UP (ref 32–36)
MCV RBC AUTO: 82.1 FL — SIGNIFICANT CHANGE UP (ref 80–100)
NRBC # FLD: 0 — SIGNIFICANT CHANGE UP
PHOSPHATE SERPL-MCNC: 3.7 MG/DL — SIGNIFICANT CHANGE UP (ref 2.5–4.5)
PLATELET # BLD AUTO: 207 K/UL — SIGNIFICANT CHANGE UP (ref 150–400)
PMV BLD: 12.6 FL — SIGNIFICANT CHANGE UP (ref 7–13)
POTASSIUM SERPL-MCNC: 4.1 MMOL/L — SIGNIFICANT CHANGE UP (ref 3.5–5.3)
POTASSIUM SERPL-SCNC: 4.1 MMOL/L — SIGNIFICANT CHANGE UP (ref 3.5–5.3)
RBC # BLD: 4.24 M/UL — SIGNIFICANT CHANGE UP (ref 3.8–5.2)
RBC # FLD: 12.9 % — SIGNIFICANT CHANGE UP (ref 10.3–14.5)
SODIUM SERPL-SCNC: 130 MMOL/L — LOW (ref 135–145)
SPECIMEN SOURCE: SIGNIFICANT CHANGE UP
WBC # BLD: 9.89 K/UL — SIGNIFICANT CHANGE UP (ref 3.8–10.5)
WBC # FLD AUTO: 9.89 K/UL — SIGNIFICANT CHANGE UP (ref 3.8–10.5)

## 2018-12-16 PROCEDURE — 99233 SBSQ HOSP IP/OBS HIGH 50: CPT | Mod: GC

## 2018-12-16 RX ORDER — SODIUM CHLORIDE 9 MG/ML
1000 INJECTION, SOLUTION INTRAVENOUS
Qty: 0 | Refills: 0 | Status: DISCONTINUED | OUTPATIENT
Start: 2018-12-16 | End: 2018-12-17

## 2018-12-16 RX ORDER — SODIUM CHLORIDE 9 MG/ML
1000 INJECTION INTRAMUSCULAR; INTRAVENOUS; SUBCUTANEOUS
Qty: 0 | Refills: 0 | Status: DISCONTINUED | OUTPATIENT
Start: 2018-12-16 | End: 2018-12-16

## 2018-12-16 RX ADMIN — Medication 650 MILLIGRAM(S): at 13:00

## 2018-12-16 RX ADMIN — PREGABALIN 1000 MICROGRAM(S): 225 CAPSULE ORAL at 12:13

## 2018-12-16 RX ADMIN — Medication 1000 UNIT(S): at 12:13

## 2018-12-16 RX ADMIN — Medication 50 MILLIGRAM(S): at 06:03

## 2018-12-16 RX ADMIN — Medication 0.5 MILLIGRAM(S): at 12:13

## 2018-12-16 RX ADMIN — SODIUM CHLORIDE 100 MILLILITER(S): 9 INJECTION INTRAMUSCULAR; INTRAVENOUS; SUBCUTANEOUS at 10:27

## 2018-12-16 RX ADMIN — SODIUM CHLORIDE 100 MILLILITER(S): 9 INJECTION, SOLUTION INTRAVENOUS at 12:13

## 2018-12-16 RX ADMIN — FAMOTIDINE 40 MILLIGRAM(S): 10 INJECTION INTRAVENOUS at 21:58

## 2018-12-16 RX ADMIN — Medication 650 MILLIGRAM(S): at 14:00

## 2018-12-16 RX ADMIN — SODIUM CHLORIDE 100 MILLILITER(S): 9 INJECTION, SOLUTION INTRAVENOUS at 22:00

## 2018-12-16 RX ADMIN — Medication 650 MILLIGRAM(S): at 21:58

## 2018-12-16 RX ADMIN — ATORVASTATIN CALCIUM 20 MILLIGRAM(S): 80 TABLET, FILM COATED ORAL at 21:58

## 2018-12-16 RX ADMIN — HEPARIN SODIUM 5000 UNIT(S): 5000 INJECTION INTRAVENOUS; SUBCUTANEOUS at 13:00

## 2018-12-16 RX ADMIN — HEPARIN SODIUM 5000 UNIT(S): 5000 INJECTION INTRAVENOUS; SUBCUTANEOUS at 06:02

## 2018-12-16 RX ADMIN — Medication 100 MICROGRAM(S): at 06:03

## 2018-12-16 RX ADMIN — Medication 1 MILLIGRAM(S): at 12:13

## 2018-12-16 RX ADMIN — Medication 650 MILLIGRAM(S): at 22:50

## 2018-12-16 RX ADMIN — HEPARIN SODIUM 5000 UNIT(S): 5000 INJECTION INTRAVENOUS; SUBCUTANEOUS at 22:00

## 2018-12-16 NOTE — PROGRESS NOTE ADULT - SUBJECTIVE AND OBJECTIVE BOX
ROBIN DASILVA  58y  Female      Subjective:     Patient recently admitted to medicine service for colitis c/b hyponatremia.    She continues to endorse headaches that are diffused throughout her head. She states she has not slept well since previous admission.  She continues to endorse abdominal pain since previous visit. She denies fevers, blurry vision, BP, SOB, vomitting, diarhea and dysuria.      Vital Signs Last 24 Hrs  T(C): 36.8 (16 Dec 2018 05:58), Max: 36.9 (15 Dec 2018 21:01)  T(F): 98.3 (16 Dec 2018 05:58), Max: 98.4 (15 Dec 2018 21:01)  HR: 74 (16 Dec 2018 05:58) (74 - 81)  BP: 140/77 (16 Dec 2018 05:58) (135/78 - 141/75)  BP(mean): --  RR: 17 (16 Dec 2018 05:58) (17 - 18)  SpO2: 100% (16 Dec 2018 05:58) (98% - 100%)      PHYSICAL EXAM:  GENERAL: NAD, resting comfortably in bed; daughter present at bedside  HEENT - NC/AT, pupils equal and reactive to light,  ; dry mucous membranes,   NECK: Supple, No JVD  CHEST/LUNG: Clear to auscultation bilaterally; No rales, rhonchi, wheezing  HEART: Regular rate and rhythm; No murmurs, rubs, or gallops  ABDOMEN: Soft, nondistended abdomen, no HSM; mildly tender to palpation in RUQ and RLQ; Bowel sounds present  EXTREMITIES:  2+ Peripheral Pulses, No clubbing, cyanosis, or edema  NEURO:  No Focal deficits, sensory and motor intact  SKIN: No rashes or lesions    Consultant(s) Notes Reviewed:  [x ] YES  [ ] NO  Care Discussed with Consultants/Other Providers [ x] YES  [ ] NO    LABS:      The Labs were reviewed by me   The Radiology was reviewed by me    EKG tracing reviewed by me        130<L>  |  96<L>  |  13  ----------------------------<  94  4.1   |  18<L>  |  1.44<H>  12-15    134<L>  |  102  |  12  ----------------------------<  95  4.5   |  21<L>  |  1.46<H>      127<L>  |  94<L>  |  13  ----------------------------<  101<H>  4.7   |  20<L>  |  1.40<H>    Ca    8.8      16 Dec 2018 05:19  Ca    8.9      15 Dec 2018 06:15  Ca    9.1      14 Dec 2018 20:25  Phos  3.7       Mg     1.7         TPro  7.8  /  Alb  4.4  /  TBili  0.4  /  DBili  x   /  AST  25  /  ALT  23  /  AlkPhos  87      Magnesium, Serum: 1.7 mg/dL (18 @ 05:19)  Magnesium, Serum: 2.0 mg/dL (12-15-18 @ 06:15)  Magnesium, Serum: 1.3 mg/dL (18 @ 20:25)    Phosphorus Level, Serum: 3.7 mg/dL (18 @ 05:19)  Phosphorus Level, Serum: 3.9 mg/dL (12-15-18 @ 06:15)  Phosphorus Level, Serum: 3.0 mg/dL (18 @ 20:25)      PT/INR - ( 14 Dec 2018 20:25 )   PT: 10.7 SEC;   INR: 0.97          PTT - ( 14 Dec 2018 20:25 )  PTT:30.9 SEC              Urinalysis Basic - ( 14 Dec 2018 18:38 )    Color: COLORLESS / Appearance: CLEAR / S.006 / pH: 6.0  Gluc: NEGATIVE / Ketone: NEGATIVE  / Bili: NEGATIVE / Urobili: NORMAL   Blood: NEGATIVE / Protein: NEGATIVE / Nitrite: NEGATIVE   Leuk Esterase: TRACE / RBC: 0-2 / WBC 3-5   Sq Epi: OCC / Non Sq Epi: x / Bacteria: NEGATIVE                              11.3   9.89  )-----------( 207      ( 16 Dec 2018 05:19 )             34.8                         12.0   10.38 )-----------( 217      ( 14 Dec 2018 20:25 )             36.4     CAPILLARY BLOOD GLUCOSE              RADIOLOGY & ADDITIONAL TESTS:    MedsMEDICATIONS  (STANDING):  atorvastatin 20 milliGRAM(s) Oral at bedtime  cholecalciferol 1000 Unit(s) Oral daily  clonazePAM Tablet 0.5 milliGRAM(s) Oral daily  cyanocobalamin 1000 MICROGram(s) Oral daily  famotidine    Tablet 40 milliGRAM(s) Oral at bedtime  folic acid 1 milliGRAM(s) Oral daily  heparin  Injectable 5000 Unit(s) SubCutaneous every 8 hours  levothyroxine 100 MICROGram(s) Oral daily  metoprolol succinate ER 50 milliGRAM(s) Oral daily    MEDICATIONS  (PRN):  acetaminophen   Tablet .. 650 milliGRAM(s) Oral every 6 hours PRN Severe Pain (7 - 10)

## 2018-12-16 NOTE — PROGRESS NOTE ADULT - PROBLEM SELECTOR PLAN 2
Pt with acute on chronic hyponatremia  Urinary work up consistent with polydipsia   Serum sodium trending down  Recommend fluid restriction 1L/day  Monitor serum sodium   Avoid overcorrection >8 meQ in 24 hrs.

## 2018-12-16 NOTE — PROGRESS NOTE ADULT - PROBLEM SELECTOR PLAN 1
- patient with 2 episodes of diarrhea yesterday, N/V, and abdominal pain with CT findings suggestive of mild colitis  - patient afebrile with no leukocytosis, will monitor off antibiotics for now  - continue supportive care with IV fluids, electrolyte repletion, and PRN reglan (monitor QTc)  - GI PCR and stool studies - Na 127 on admission , given 1L of NS in the ED  - Nephrology following. Krystin rinaldi.  - Patient started on a fluid restricted diet 2/2 to polydipsia  - Fluids changed to 1/2 NS with 70mEq of bicarb for worsening acidosis.

## 2018-12-16 NOTE — PROGRESS NOTE ADULT - PROBLEM SELECTOR PLAN 6
- stable  - continue home synthroid - per daughter, patient with BP of 190s at home, has been in the 150s-160s systolic while inpatient   - only has metoprolol as a BP med but daughter thinks she forgot a medication at home --> will need to clarify with the pharmacy what her other blood pressure medications are

## 2018-12-16 NOTE — PROGRESS NOTE ADULT - ASSESSMENT
This is a 59 y/o F with a PMH significant for CVA (4 years ago), HTN, chronic headache, and GERD who is admitted for colitis complicated by hyponatremia.

## 2018-12-16 NOTE — PROGRESS NOTE ADULT - PROBLEM SELECTOR PLAN 4
- dizziness appears to be a chronic issue for the patient and she follows with her PMD  - may be acutely worsened secondary to acute GI illness vs acute clonazepam withdrawal as patient was previously taking the medication BID daily and suddenly stopped in 1 week ago (has nausea headache, stomach pain, dizziness)  - will restart clonazepam 0.5mg daily to begin titrating off  - CT head negative for any acute pathology   - will order orthostatics for further workup - patient with Cr of 1.40 with Cr of 1.45 back in November  - Cr at baseline  - Renally dose medication  - avoid nephrotoxic agents

## 2018-12-16 NOTE — PHYSICAL THERAPY INITIAL EVALUATION ADULT - ADDITIONAL COMMENTS
Pt's history was obtained by daughter @bedside. Pt lives in a private house with daughter, son-in-law, and 14 year old grandchild with ~3STE and a flight of stairs to negotiate to bedroom ;(+) 1 handrails. Prior to hospital admission pt ambulated with assistance. Pt does have single axis cane @ home if she needs. Pt has had no recent falls.    Pt left comfortable in bed, NAD, all lines intact, all precautions maintained, with call bell in reach, family @bedside, and RN aware of PT evaluations.

## 2018-12-16 NOTE — PROGRESS NOTE ADULT - PROBLEM SELECTOR PLAN 3
in setting of GI losses  Monitor serum Co2  Recommend 1/2NS with 75mEQ of bicarb at 100cc/hr for 1 day

## 2018-12-16 NOTE — PHYSICAL THERAPY INITIAL EVALUATION ADULT - GAIT DISTANCE, PT EVAL
4 lateral steps; further ambulation deferred 2/2 to pt reporting dizziness; vitals taken /83mmHg; RN made aware

## 2018-12-16 NOTE — PROGRESS NOTE ADULT - PROBLEM SELECTOR PLAN 8
- DVT ppx  - DASH diet  - Dispo pending resolution of symptoms    Gissell Pathak  PGY-1 Internal Medicine  Pager: [phone] - stable  - continue home famotidine

## 2018-12-16 NOTE — PHYSICAL THERAPY INITIAL EVALUATION ADULT - DISCHARGE DISPOSITION, PT EVAL
rehabilitation facility/however pt's family wishes to take pt home; and therefore will need home with home PT to optimize safety and return to prior level of function. rehabilitation facility/however pt's family wishes to take pt home; and therefore will need home PT to optimize safety and return to prior level of function.

## 2018-12-16 NOTE — PROGRESS NOTE ADULT - PROBLEM SELECTOR PLAN 1
Pt with CKD  Baseline Sc 1.3   Renal function stable today   UA with no protein no RBC  Monitor BMP   Avoid nephrotoxics, NSAIDs RCA.

## 2018-12-16 NOTE — PROGRESS NOTE ADULT - PROBLEM SELECTOR PLAN 5
- per daughter, patient with BP of 190s at home, has been in the 150s-160s systolic while inpatient   - only has metoprolol as a BP med but daughter thinks she forgot a medication at home --> will need to clarify with the pharmacy what her other blood pressure medications are - dizziness appears to be a chronic issue for the patient and she follows with her PMD  - may be acutely worsened secondary to acute GI illness vs acute clonazepam withdrawal as patient was previously taking the medication BID daily and suddenly stopped in 1 week ago (has nausea headache, stomach pain, dizziness)  - will restart clonazepam 0.5mg daily to begin titrating off  - CT head negative for any acute pathology   - will order orthostatics for further workup

## 2018-12-16 NOTE — PROGRESS NOTE ADULT - PROBLEM SELECTOR PLAN 3
- patient with Cr of 1.40 with Cr of 1.45 back in November  - Cr at baseline  - Renally dose medication  - avoid nephrotoxic agents - Likely 2/2 to GI loss  - Nephrology following. Appreciate recs.  - Will switch fluids to 1/2 NS with 75 mEQs of bicarb

## 2018-12-16 NOTE — PROGRESS NOTE ADULT - PROBLEM SELECTOR PLAN 2
- Na 127 on admission , given 1L of NS in the ED  - Am BMP shows improvement of sodium to 127. Continue to monitor - Na 127 on admission , given 1L of NS in the ED  - Am BMP shows improvement of sodium to 127. Continue to monitor  - C/w with NS at 100 ml/hr for an additional day  - Nephrology following. Appreciate recs. Will start patient on a fluid restricted diet to 1 L due to polydipsia. - Na 127 on admission , given 1L of NS in the ED  - Nephrology following. Krystin rinaldi.  - Patient started on a fluid restricted diet 2/2 to polydipsia  - Fluids changed to 1/2 NS with 70mEq of bicarb for worsening acidosis. - patient with 2 episodes of diarrhea yesterday, N/V, and abdominal pain with CT findings suggestive of mild colitis  - patient afebrile with no leukocytosis, will monitor off antibiotics for now  - continue supportive care with IV fluids, electrolyte repletion, and PRN reglan (monitor QTc)  - GI PCR and stool studies

## 2018-12-16 NOTE — PHYSICAL THERAPY INITIAL EVALUATION ADULT - PATIENT PROFILE REVIEW, REHAB EVAL
yes/ACTIVITY: Increase as Tolerated; spoke with PENG Cruz prior to PT evaluation--> Pt OK for PT consult/OOB activity

## 2018-12-16 NOTE — PHYSICAL THERAPY INITIAL EVALUATION ADULT - DIAGNOSIS, PT EVAL
Pt admitted for colitis complicated by hyponatremia; No CT evidence of acute intracranial hemorrhage, mass effect or large vascular territory distribution infarct, pt presents with decreased strength, decreased balance, and decreased visual tracking

## 2018-12-16 NOTE — PROGRESS NOTE ADULT - SUBJECTIVE AND OBJECTIVE BOX
[facility] [place] [facility]   MD GAB REARDON MD RUORU WONG, PA    TEL:  OFFICE: [phone]  DR SHIRLEY CELL: [phone]  ALLI SIN CELL: [phone]  DR. SHANE CELL: [phone]    RENAL FOLLOW UP NOTE  --------------------------------------------------------------------------------  HPI:      Pt seen and examined at bedside.   Sarah STERN, chest pain     PAST HISTORY  --------------------------------------------------------------------------------  No significant changes to PMH, PSH, FHx, SHx, unless otherwise noted    ALLERGIES & MEDICATIONS  --------------------------------------------------------------------------------  Allergies    No Known Allergies    Intolerances      Standing Inpatient Medications  atorvastatin 20 milliGRAM(s) Oral at bedtime  cholecalciferol 1000 Unit(s) Oral daily  clonazePAM Tablet 0.5 milliGRAM(s) Oral daily  cyanocobalamin 1000 MICROGram(s) Oral daily  famotidine    Tablet 40 milliGRAM(s) Oral at bedtime  folic acid 1 milliGRAM(s) Oral daily  heparin  Injectable 5000 Unit(s) SubCutaneous every 8 hours  levothyroxine 100 MICROGram(s) Oral daily  metoprolol succinate ER 50 milliGRAM(s) Oral daily  sodium chloride 0.9%. 1000 milliLiter(s) IV Continuous <Continuous>    PRN Inpatient Medications  acetaminophen   Tablet .. 650 milliGRAM(s) Oral every 6 hours PRN      REVIEW OF SYSTEMS  --------------------------------------------------------------------------------  General: no fever  CVS: no chest pain  RESP: no sob, no cough  ABD: no abdominal pain      VITALS/PHYSICAL EXAM  --------------------------------------------------------------------------------  T(C): 36.8 (12-16-18 @ 05:58), Max: 36.9 (12-15-18 @ 21:01)  HR: 74 (12-16-18 @ 05:58) (74 - 81)  BP: 140/77 (12-16-18 @ 05:58) (135/78 - 141/75)  RR: 17 (12-16-18 @ 05:58) (17 - 18)  SpO2: 100% (12-16-18 @ 05:58) (98% - 100%)  Wt(kg): --  Height (cm): 180.34 (12-15-18 @ 02:22)  Weight (kg): 59.4 (12-15-18 @ 02:22)  BMI (kg/m2): 18.3 (12-15-18 @ 02:22)  BSA (m2): 1.76 (12-15-18 @ 02:22)      Physical Exam:  	Gen: NAD  	HEENT: MMM  	Pulm: CTA B/L  	CV: S1S2  	Abd: Soft, +BS  	Ext: No LE edema B/L                      Neuro: Awake   	Skin: Warm and Dry       LABS/STUDIES  --------------------------------------------------------------------------------              11.3   9.89  >-----------<  207      [12-16-18 @ 05:19]              34.8     130  |  96  |  13  ----------------------------<  94      [12-16-18 @ 05:19]  4.1   |  18  |  1.44        Ca     8.8     [12-16-18 @ 05:19]      Mg     1.7     [12-16-18 @ 05:19]      Phos  3.7     [12-16-18 @ 05:19]    TPro  7.8  /  Alb  4.4  /  TBili  0.4  /  DBili  x   /  AST  25  /  ALT  23  /  AlkPhos  87  [12-14-18 @ 20:25]    PT/INR: PT 10.7 , INR 0.97       [12-14-18 @ 20:25]  PTT: 30.9       [12-14-18 @ 20:25]    Serum Osmolality 270      [12-14-18 @ 20:25]    Creatinine Trend:  SCr 1.44 [12-16 @ 05:19]  SCr 1.46 [12-15 @ 06:15]  SCr 1.40 [12-14 @ 20:25]  SCr 1.45 [11-24 @ 17:31]    Urinalysis - [12-14-18 @ 18:38]      Color COLORLESS / Appearance CLEAR / SG 1.006 / pH 6.0      Gluc NEGATIVE / Ketone NEGATIVE  / Bili NEGATIVE / Urobili NORMAL       Blood NEGATIVE / Protein NEGATIVE / Leuk Est TRACE / Nitrite NEGATIVE      RBC 0-2 / WBC 3-5 / Hyaline NEGATIVE / Gran  / Sq Epi OCC / Non Sq Epi  / Bacteria NEGATIVE    Urine Creatinine 23.10      [12-14-18 @ 23:57]  Urine Sodium 56      [12-14-18 @ 23:57]  Urine Potassium 14.7      [12-14-18 @ 23:57]  Urine Chloride 59      [12-14-18 @ 23:57]  Urine Osmolality 191      [12-14-18 @ 23:57]

## 2018-12-17 DIAGNOSIS — R63.1 POLYDIPSIA: ICD-10-CM

## 2018-12-17 LAB
BUN SERPL-MCNC: 12 MG/DL — SIGNIFICANT CHANGE UP (ref 7–23)
CALCIUM SERPL-MCNC: 8.9 MG/DL — SIGNIFICANT CHANGE UP (ref 8.4–10.5)
CHLORIDE SERPL-SCNC: 99 MMOL/L — SIGNIFICANT CHANGE UP (ref 98–107)
CO2 SERPL-SCNC: 22 MMOL/L — SIGNIFICANT CHANGE UP (ref 22–31)
CREAT SERPL-MCNC: 1.45 MG/DL — HIGH (ref 0.5–1.3)
GI PCR PANEL, STOOL: SIGNIFICANT CHANGE UP
GLUCOSE SERPL-MCNC: 92 MG/DL — SIGNIFICANT CHANGE UP (ref 70–99)
HCT VFR BLD CALC: 35.2 % — SIGNIFICANT CHANGE UP (ref 34.5–45)
HGB BLD-MCNC: 11.4 G/DL — LOW (ref 11.5–15.5)
MAGNESIUM SERPL-MCNC: 1.6 MG/DL — SIGNIFICANT CHANGE UP (ref 1.6–2.6)
MCHC RBC-ENTMCNC: 26.8 PG — LOW (ref 27–34)
MCHC RBC-ENTMCNC: 32.4 % — SIGNIFICANT CHANGE UP (ref 32–36)
MCV RBC AUTO: 82.6 FL — SIGNIFICANT CHANGE UP (ref 80–100)
NRBC # FLD: 0 — SIGNIFICANT CHANGE UP
PHOSPHATE SERPL-MCNC: 3.8 MG/DL — SIGNIFICANT CHANGE UP (ref 2.5–4.5)
PLATELET # BLD AUTO: 206 K/UL — SIGNIFICANT CHANGE UP (ref 150–400)
PMV BLD: 12 FL — SIGNIFICANT CHANGE UP (ref 7–13)
POTASSIUM SERPL-MCNC: 4.2 MMOL/L — SIGNIFICANT CHANGE UP (ref 3.5–5.3)
POTASSIUM SERPL-SCNC: 4.2 MMOL/L — SIGNIFICANT CHANGE UP (ref 3.5–5.3)
RBC # BLD: 4.26 M/UL — SIGNIFICANT CHANGE UP (ref 3.8–5.2)
RBC # FLD: 12.8 % — SIGNIFICANT CHANGE UP (ref 10.3–14.5)
SODIUM SERPL-SCNC: 136 MMOL/L — SIGNIFICANT CHANGE UP (ref 135–145)
SPECIMEN SOURCE: SIGNIFICANT CHANGE UP
SPECIMEN SOURCE: SIGNIFICANT CHANGE UP
WBC # BLD: 7.39 K/UL — SIGNIFICANT CHANGE UP (ref 3.8–10.5)
WBC # FLD AUTO: 7.39 K/UL — SIGNIFICANT CHANGE UP (ref 3.8–10.5)

## 2018-12-17 PROCEDURE — 99233 SBSQ HOSP IP/OBS HIGH 50: CPT | Mod: GC

## 2018-12-17 PROCEDURE — 70551 MRI BRAIN STEM W/O DYE: CPT | Mod: 26

## 2018-12-17 RX ADMIN — HEPARIN SODIUM 5000 UNIT(S): 5000 INJECTION INTRAVENOUS; SUBCUTANEOUS at 22:12

## 2018-12-17 RX ADMIN — HEPARIN SODIUM 5000 UNIT(S): 5000 INJECTION INTRAVENOUS; SUBCUTANEOUS at 06:36

## 2018-12-17 RX ADMIN — ATORVASTATIN CALCIUM 20 MILLIGRAM(S): 80 TABLET, FILM COATED ORAL at 22:11

## 2018-12-17 RX ADMIN — Medication 1000 UNIT(S): at 12:18

## 2018-12-17 RX ADMIN — HEPARIN SODIUM 5000 UNIT(S): 5000 INJECTION INTRAVENOUS; SUBCUTANEOUS at 14:21

## 2018-12-17 RX ADMIN — Medication 50 MILLIGRAM(S): at 06:36

## 2018-12-17 RX ADMIN — PREGABALIN 1000 MICROGRAM(S): 225 CAPSULE ORAL at 12:18

## 2018-12-17 RX ADMIN — FAMOTIDINE 40 MILLIGRAM(S): 10 INJECTION INTRAVENOUS at 22:12

## 2018-12-17 RX ADMIN — Medication 1 MILLIGRAM(S): at 12:18

## 2018-12-17 RX ADMIN — Medication 100 MICROGRAM(S): at 06:36

## 2018-12-17 RX ADMIN — Medication 0.5 MILLIGRAM(S): at 12:20

## 2018-12-17 NOTE — PROGRESS NOTE ADULT - PROBLEM SELECTOR PLAN 5
- patient with Cr of 1.40 with Cr of 1.45 back in November  - Cr at baseline  - Renally dose medication  - avoid nephrotoxic agents

## 2018-12-17 NOTE — PROGRESS NOTE ADULT - PROBLEM SELECTOR PLAN 1
- Nephrology following. Appreciate recs.  - Patient started on a fluid restricted diet 2/2 to polydipsia  - Fluids changed to 1/2 NS with 70mEq of bicarb for worsening acidosis. - Nephrology following. Appreciate recs.  - Patient started on a fluid restricted diet 2/2 to polydipsia  - Pt was on 1/2 NS with 70mEq of bicarb for worsening acidosis. Acidosis improved

## 2018-12-17 NOTE — PROGRESS NOTE ADULT - PROBLEM SELECTOR PLAN 3
in setting of GI losses  Monitor serum Co2, improving  s/p 1/2NS with 75mEQ of bicarb at 100cc/hr for 1 day

## 2018-12-17 NOTE — PROGRESS NOTE ADULT - PROBLEM SELECTOR PLAN 4
- Likely 2/2 to GI loss  - Nephrology following. Appreciate recs.  - Will switch fluids to 1/2 NS with 75 mEQs of bicarb

## 2018-12-17 NOTE — PROGRESS NOTE ADULT - SUBJECTIVE AND OBJECTIVE BOX
ROBIN DASILVA  58y  Female      Subjective:     NAEO.    Vital Signs Last 24 Hrs  T(C): 36.9 (17 Dec 2018 06:32), Max: 36.9 (17 Dec 2018 06:32)  T(F): 98.5 (17 Dec 2018 06:32), Max: 98.5 (17 Dec 2018 06:32)  HR: 76 (17 Dec 2018 06:32) (76 - 82)  BP: 104/60 (17 Dec 2018 06:32) (104/60 - 162/79)  BP(mean): --  RR: 16 (17 Dec 2018 06:32) (16 - 18)  SpO2: 100% (17 Dec 2018 06:32) (99% - 100%)      PHYSICAL EXAM:  GENERAL: NAD, well-groomed, well-developed  HEENT - NC/AT, pupils equal and reactive to light,  ; Moist mucous membranes, Good dentition, No lesions  NECK: Supple, No JVD  CHEST/LUNG: Clear to auscultation bilaterally; No rales, rhonchi, wheezing  HEART: Regular rate and rhythm; No murmurs, rubs, or gallops  ABDOMEN: Soft, Nontender, Nondistended; Bowel sounds present  EXTREMITIES:  2+ Peripheral Pulses, No clubbing, cyanosis, or edema  NEURO:  No Focal deficits, sensory and motor intact  SKIN: No rashes or lesions    Consultant(s) Notes Reviewed:  [x ] YES  [ ] NO  Care Discussed with Consultants/Other Providers [ x] YES  [ ] NO    LABS:          RADIOLOGY & ADDITIONAL TESTS:    MedsMEDICATIONS  (STANDING):  atorvastatin 20 milliGRAM(s) Oral at bedtime  cholecalciferol 1000 Unit(s) Oral daily  clonazePAM Tablet 0.5 milliGRAM(s) Oral daily  cyanocobalamin 1000 MICROGram(s) Oral daily  famotidine    Tablet 40 milliGRAM(s) Oral at bedtime  folic acid 1 milliGRAM(s) Oral daily  heparin  Injectable 5000 Unit(s) SubCutaneous every 8 hours  levothyroxine 100 MICROGram(s) Oral daily  metoprolol succinate ER 50 milliGRAM(s) Oral daily  sodium chloride 0.45% 1000 milliLiter(s) (100 mL/Hr) IV Continuous <Continuous>    MEDICATIONS  (PRN):  acetaminophen   Tablet .. 650 milliGRAM(s) Oral every 6 hours PRN Severe Pain (7 - 10) ROBIN DASILVA  58y  Female      Subjective:     NAEO. This am, patient endorses minor left sides weakness and vertigo which have been ongoing for the past few weeks.    Vital Signs Last 24 Hrs  T(C): 36.9 (17 Dec 2018 06:32), Max: 36.9 (17 Dec 2018 06:32)  T(F): 98.5 (17 Dec 2018 06:32), Max: 98.5 (17 Dec 2018 06:32)  HR: 76 (17 Dec 2018 06:32) (76 - 82)  BP: 104/60 (17 Dec 2018 06:32) (104/60 - 162/79)  BP(mean): --  RR: 16 (17 Dec 2018 06:32) (16 - 18)  SpO2: 100% (17 Dec 2018 06:32) (99% - 100%)      PHYSICAL EXAM:  GENERAL: NAD, well-groomed, well-developed  HEENT - NC/AT, pupils equal and reactive to light,  ; Moist mucous membranes, Good dentition, No lesions  NECK: Supple, No JVD  CHEST/LUNG: Clear to auscultation bilaterally; No rales, rhonchi, wheezing  HEART: Regular rate and rhythm; No murmurs, rubs, or gallops  ABDOMEN: Soft, Nontender, Nondistended; Bowel sounds present  EXTREMITIES:  2+ Peripheral Pulses, No clubbing, cyanosis, or edema  NEURO:  No Focal deficits, sensory and motor intact; left side slightly weaker (but can still resist), no pronator drift,   SKIN: No rashes or lesions    Consultant(s) Notes Reviewed:  [x ] YES  [ ] NO  Care Discussed with Consultants/Other Providers [ x] YES  [ ] NO    LABS:      The Labs were reviewed by me   The Radiology was reviewed by me    EKG tracing reviewed by me    12-17    136  |  99  |  12  ----------------------------<  92  4.2   |  22  |  1.45<H>  12-16    130<L>  |  96<L>  |  13  ----------------------------<  94  4.1   |  18<L>  |  1.44<H>  12-15    134<L>  |  102  |  12  ----------------------------<  95  4.5   |  21<L>  |  1.46<H>    Ca    8.9      17 Dec 2018 06:57  Ca    8.8      16 Dec 2018 05:19  Ca    8.9      15 Dec 2018 06:15  Phos  3.8     12-17  Mg     1.6     12-17    TPro  7.8  /  Alb  4.4  /  TBili  0.4  /  DBili  x   /  AST  25  /  ALT  23  /  AlkPhos  87  12-14    Magnesium, Serum: 1.6 mg/dL (12-17-18 @ 06:57)  Magnesium, Serum: 1.7 mg/dL (12-16-18 @ 05:19)  Magnesium, Serum: 2.0 mg/dL (12-15-18 @ 06:15)  Magnesium, Serum: 1.3 mg/dL (12-14-18 @ 20:25)    Phosphorus Level, Serum: 3.8 mg/dL (12-17-18 @ 06:57)  Phosphorus Level, Serum: 3.7 mg/dL (12-16-18 @ 05:19)  Phosphorus Level, Serum: 3.9 mg/dL (12-15-18 @ 06:15)  Phosphorus Level, Serum: 3.0 mg/dL (12-14-18 @ 20:25)                                              11.4   7.39  )-----------( 206      ( 17 Dec 2018 06:57 )             35.2                         11.3   9.89  )-----------( 207      ( 16 Dec 2018 05:19 )             34.8                         12.0   10.38 )-----------( 217      ( 14 Dec 2018 20:25 )             36.4     CAPILLARY BLOOD GLUCOSE              RADIOLOGY & ADDITIONAL TESTS:    MedsMEDICATIONS  (STANDING):  atorvastatin 20 milliGRAM(s) Oral at bedtime  cholecalciferol 1000 Unit(s) Oral daily  clonazePAM Tablet 0.5 milliGRAM(s) Oral daily  cyanocobalamin 1000 MICROGram(s) Oral daily  famotidine    Tablet 40 milliGRAM(s) Oral at bedtime  folic acid 1 milliGRAM(s) Oral daily  heparin  Injectable 5000 Unit(s) SubCutaneous every 8 hours  levothyroxine 100 MICROGram(s) Oral daily  metoprolol succinate ER 50 milliGRAM(s) Oral daily  sodium chloride 0.45% 1000 milliLiter(s) (100 mL/Hr) IV Continuous <Continuous>    MEDICATIONS  (PRN):  acetaminophen   Tablet .. 650 milliGRAM(s) Oral every 6 hours PRN Severe Pain (7 - 10) ROBIN DASILVA  58y  Female      Subjective:     No overnight events. This am, patient endorses minor left sides weakness and vertigo which have been ongoing for the past few weeks.    Vital Signs Last 24 Hrs  T(C): 36.9 (17 Dec 2018 06:32), Max: 36.9 (17 Dec 2018 06:32)  T(F): 98.5 (17 Dec 2018 06:32), Max: 98.5 (17 Dec 2018 06:32)  HR: 76 (17 Dec 2018 06:32) (76 - 82)  BP: 104/60 (17 Dec 2018 06:32) (104/60 - 162/79)  BP(mean): --  RR: 16 (17 Dec 2018 06:32) (16 - 18)  SpO2: 100% (17 Dec 2018 06:32) (99% - 100%)      PHYSICAL EXAM:  GENERAL: NAD, well-groomed, well-developed  HEENT - NC/AT, pupils equal and reactive to light,  ; Moist mucous membranes, Good dentition, No lesions  NECK: Supple, No JVD  CHEST/LUNG: Clear to auscultation bilaterally; No rales, rhonchi, wheezing  HEART: Regular rate and rhythm; No murmurs, rubs, or gallops  ABDOMEN: Soft, Nontender, Nondistended; Bowel sounds present  EXTREMITIES:  2+ Peripheral Pulses, No clubbing, cyanosis, or edema  NEURO:  No Focal deficits, sensory and motor intact; left side slightly weaker (but can still resist), no pronator drift,   SKIN: No rashes or lesions    Consultant(s) Notes Reviewed:  [x ] YES  [ ] NO  Care Discussed with Consultants/Other Providers [ x] YES  [ ] NO    LABS:      The Labs were reviewed by me   The Radiology was reviewed by me    EKG tracing reviewed by me    12-17    136  |  99  |  12  ----------------------------<  92  4.2   |  22  |  1.45<H>  12-16    130<L>  |  96<L>  |  13  ----------------------------<  94  4.1   |  18<L>  |  1.44<H>  12-15    134<L>  |  102  |  12  ----------------------------<  95  4.5   |  21<L>  |  1.46<H>    Ca    8.9      17 Dec 2018 06:57  Ca    8.8      16 Dec 2018 05:19  Ca    8.9      15 Dec 2018 06:15  Phos  3.8     12-17  Mg     1.6     12-17    TPro  7.8  /  Alb  4.4  /  TBili  0.4  /  DBili  x   /  AST  25  /  ALT  23  /  AlkPhos  87  12-14    Magnesium, Serum: 1.6 mg/dL (12-17-18 @ 06:57)  Magnesium, Serum: 1.7 mg/dL (12-16-18 @ 05:19)  Magnesium, Serum: 2.0 mg/dL (12-15-18 @ 06:15)  Magnesium, Serum: 1.3 mg/dL (12-14-18 @ 20:25)    Phosphorus Level, Serum: 3.8 mg/dL (12-17-18 @ 06:57)  Phosphorus Level, Serum: 3.7 mg/dL (12-16-18 @ 05:19)  Phosphorus Level, Serum: 3.9 mg/dL (12-15-18 @ 06:15)  Phosphorus Level, Serum: 3.0 mg/dL (12-14-18 @ 20:25)                                              11.4   7.39  )-----------( 206      ( 17 Dec 2018 06:57 )             35.2                         11.3   9.89  )-----------( 207      ( 16 Dec 2018 05:19 )             34.8                         12.0   10.38 )-----------( 217      ( 14 Dec 2018 20:25 )             36.4     CAPILLARY BLOOD GLUCOSE      GI PCR Panel, Stool (12.15.18 @ 22:26)    GI PCR Panel, Stool: GI PCR Results:  NOT DETECTED    Stool Culture - Stool:   ****************** PRELIMINARY **************************  NEGATIVE FOR SALMONELLA, SHIGELLA, YERSINIA,  E. COLI O157, AEROMONAS, PLESIOMONAS, AND VIBRIO SP.                      AFTER 24 HOURS  ********************************************************* (12.15.18 @ 22:26)          RADIOLOGY & ADDITIONAL TESTS:    MedsMEDICATIONS  (STANDING):  atorvastatin 20 milliGRAM(s) Oral at bedtime  cholecalciferol 1000 Unit(s) Oral daily  clonazePAM Tablet 0.5 milliGRAM(s) Oral daily  cyanocobalamin 1000 MICROGram(s) Oral daily  famotidine    Tablet 40 milliGRAM(s) Oral at bedtime  folic acid 1 milliGRAM(s) Oral daily  heparin  Injectable 5000 Unit(s) SubCutaneous every 8 hours  levothyroxine 100 MICROGram(s) Oral daily  metoprolol succinate ER 50 milliGRAM(s) Oral daily  sodium chloride 0.45% 1000 milliLiter(s) (100 mL/Hr) IV Continuous <Continuous>    MEDICATIONS  (PRN):  acetaminophen   Tablet .. 650 milliGRAM(s) Oral every 6 hours PRN Severe Pain (7 - 10)

## 2018-12-17 NOTE — PROGRESS NOTE ADULT - PROBLEM SELECTOR PLAN 2
Pt with acute on chronic hyponatremia  Urinary work up consistent with polydipsia   Na improving  Recommend fluid restriction 1L/day  Monitor serum sodium   Avoid overcorrection >8 meQ in 24 hrs.

## 2018-12-17 NOTE — PROGRESS NOTE ADULT - PROBLEM SELECTOR PLAN 2
- Given improving hyponatremia, will liberalize fluid intake to 1.5 L - Continue 1L fluid restriction as per nephrology

## 2018-12-17 NOTE — PROGRESS NOTE ADULT - SUBJECTIVE AND OBJECTIVE BOX
[facility] [place] [facility]   MD GAB REARDON MD ANGELA WONG, PA    TEL:  OFFICE: [phone]  DR SHIRLEY CELL: [phone]  DR. SHANE CELL: [phone]  GOMEZ SIN CELL: [phone]        Patient is a 58y old  Female who presents with a chief complaint of Hyponatremia, weakness (17 Dec 2018 08:44)      Patient seen and examined at bedside. No chest pain/sob. + poor appetite, no diarrhea     VITALS:  T(F): 98.5 (12-17-18 @ 06:32), Max: 98.5 (12-17-18 @ 06:32)  HR: 76 (12-17-18 @ 06:32)  BP: 104/60 (12-17-18 @ 06:32)  RR: 16 (12-17-18 @ 06:32)  SpO2: 100% (12-17-18 @ 06:32)  Wt(kg): --    12-16 @ 07:01  -  12-17 @ 07:00  --------------------------------------------------------  IN: 0 mL / OUT: 800 mL / NET: -800 mL          PHYSICAL EXAM:  Constitutional: NAD  Neck: No JVD  Respiratory: CTAB, no wheezes, rales or rhonchi  Cardiovascular: S1, S2, RRR  Gastrointestinal: BS+, soft, NT/ND  Extremities: No peripheral edema    Hospital Medications:   MEDICATIONS  (STANDING):  atorvastatin 20 milliGRAM(s) Oral at bedtime  cholecalciferol 1000 Unit(s) Oral daily  clonazePAM Tablet 0.5 milliGRAM(s) Oral daily  cyanocobalamin 1000 MICROGram(s) Oral daily  famotidine    Tablet 40 milliGRAM(s) Oral at bedtime  folic acid 1 milliGRAM(s) Oral daily  heparin  Injectable 5000 Unit(s) SubCutaneous every 8 hours  levothyroxine 100 MICROGram(s) Oral daily  metoprolol succinate ER 50 milliGRAM(s) Oral daily      LABS:  12-17    136  |  99  |  12  ----------------------------<  92  4.2   |  22  |  1.45<H>    Ca    8.9      17 Dec 2018 06:57  Phos  3.8     12-17  Mg     1.6     12-17      Creatinine Trend: 1.45 <--, 1.44 <--, 1.46 <--, 1.40 <--    Phosphorus Level, Serum: 3.8 mg/dL (12-17 @ 06:57)                              11.4   7.39  )-----------( 206      ( 17 Dec 2018 06:57 )             35.2     Urine Studies:  Urinalysis - [12-14-18 @ 18:38]      Color COLORLESS / Appearance CLEAR / SG 1.006 / pH 6.0      Gluc NEGATIVE / Ketone NEGATIVE  / Bili NEGATIVE / Urobili NORMAL       Blood NEGATIVE / Protein NEGATIVE / Leuk Est TRACE / Nitrite NEGATIVE      RBC 0-2 / WBC 3-5 / Hyaline NEGATIVE / Gran  / Sq Epi OCC / Non Sq Epi  / Bacteria NEGATIVE    Urine Creatinine 23.10      [12-14-18 @ 23:57]  Urine Sodium 56      [12-14-18 @ 23:57]  Urine Potassium 14.7      [12-14-18 @ 23:57]  Urine Chloride 59      [12-14-18 @ 23:57]  Urine Osmolality 191      [12-14-18 @ 23:57]          RADIOLOGY & ADDITIONAL STUDIES:

## 2018-12-17 NOTE — PROGRESS NOTE ADULT - ATTENDING COMMENTS
Pt seen and examined and case d/w housestaff. Translation in Tam[place] provided by pt's family at bedside as per pt request.    Vertigo- Reportedly has been happening for 3 weeks. Worse w/ movement and standing. Reports decreased hearing and tinnitus in L ear as well. Poor balance on standing. Also w/ mild L sided weakness  L sided weakness- pt reports mild left sided weakness for few weeks. Reports she has had chronic back pain for a year w/ numbness and tingling in LLE. Pt never had this with prior CVA. On exam, pt does have 4-5/5 weakness in LUE, LLE. Romberg neg.     Plan to evaluate L ear w/ otoscope  MRI brain to evaluate for possible subacute stroke  Will f/u PCP re: hx of chronic lower back pain and prior imaging given neurologic symptoms. May need further imaging of lumbosacral spine as well Pt seen and examined and case d/w housestaff. Translation in Faroese provided by pt's family at bedside as per pt request.    Vertigo- Reportedly has been happening for 3 weeks. Worse w/ movement and standing. Reports decreased hearing and tinnitus in L ear as well. Poor balance on standing. Also w/ mild L sided weakness  L sided weakness- pt reports mild left sided weakness for few weeks. Reports she has had chronic back pain for a year w/ numbness and tingling in LLE. Pt never had this with prior CVA. On exam, pt does have 4-5/5 weakness in LUE, LLE. Romberg neg.   Hyponatremia- Now improved f/u nephrology recs    Plan to evaluate L ear w/ otoscope  MRI brain to evaluate for possible subacute stroke  Will f/u PCP re: hx of chronic lower back pain and prior imaging given neurologic symptoms. May need further imaging of lumbosacral spine as well

## 2018-12-17 NOTE — PROGRESS NOTE ADULT - PROBLEM SELECTOR PLAN 7
- per daughter, patient with BP of 190s at home, has been in the 150s-160s systolic while inpatient   - only has metoprolol as a BP med but daughter thinks she forgot a medication at home --> will need to clarify with the pharmacy what her other blood pressure medications are

## 2018-12-18 VITALS
HEART RATE: 99 BPM | OXYGEN SATURATION: 100 % | DIASTOLIC BLOOD PRESSURE: 60 MMHG | SYSTOLIC BLOOD PRESSURE: 130 MMHG | TEMPERATURE: 98 F | RESPIRATION RATE: 20 BRPM

## 2018-12-18 LAB
BACTERIA STL CULT: SIGNIFICANT CHANGE UP
BUN SERPL-MCNC: 13 MG/DL — SIGNIFICANT CHANGE UP (ref 7–23)
CALCIUM SERPL-MCNC: 8.8 MG/DL — SIGNIFICANT CHANGE UP (ref 8.4–10.5)
CHLORIDE SERPL-SCNC: 105 MMOL/L — SIGNIFICANT CHANGE UP (ref 98–107)
CO2 SERPL-SCNC: 23 MMOL/L — SIGNIFICANT CHANGE UP (ref 22–31)
CREAT SERPL-MCNC: 1.53 MG/DL — HIGH (ref 0.5–1.3)
GLUCOSE SERPL-MCNC: 93 MG/DL — SIGNIFICANT CHANGE UP (ref 70–99)
HCT VFR BLD CALC: 39.2 % — SIGNIFICANT CHANGE UP (ref 34.5–45)
HGB BLD-MCNC: 12.3 G/DL — SIGNIFICANT CHANGE UP (ref 11.5–15.5)
MAGNESIUM SERPL-MCNC: 1.7 MG/DL — SIGNIFICANT CHANGE UP (ref 1.6–2.6)
MCHC RBC-ENTMCNC: 26.9 PG — LOW (ref 27–34)
MCHC RBC-ENTMCNC: 31.4 % — LOW (ref 32–36)
MCV RBC AUTO: 85.8 FL — SIGNIFICANT CHANGE UP (ref 80–100)
NRBC # FLD: 0 — SIGNIFICANT CHANGE UP
PHOSPHATE SERPL-MCNC: 3.6 MG/DL — SIGNIFICANT CHANGE UP (ref 2.5–4.5)
PLATELET # BLD AUTO: 226 K/UL — SIGNIFICANT CHANGE UP (ref 150–400)
PMV BLD: 12.5 FL — SIGNIFICANT CHANGE UP (ref 7–13)
POTASSIUM SERPL-MCNC: 4.3 MMOL/L — SIGNIFICANT CHANGE UP (ref 3.5–5.3)
POTASSIUM SERPL-SCNC: 4.3 MMOL/L — SIGNIFICANT CHANGE UP (ref 3.5–5.3)
RBC # BLD: 4.57 M/UL — SIGNIFICANT CHANGE UP (ref 3.8–5.2)
RBC # FLD: 13.2 % — SIGNIFICANT CHANGE UP (ref 10.3–14.5)
SODIUM SERPL-SCNC: 139 MMOL/L — SIGNIFICANT CHANGE UP (ref 135–145)
WBC # BLD: 8.26 K/UL — SIGNIFICANT CHANGE UP (ref 3.8–10.5)
WBC # FLD AUTO: 8.26 K/UL — SIGNIFICANT CHANGE UP (ref 3.8–10.5)

## 2018-12-18 PROCEDURE — 99239 HOSP IP/OBS DSCHRG MGMT >30: CPT

## 2018-12-18 RX ORDER — METOCLOPRAMIDE HCL 10 MG
1 TABLET ORAL
Qty: 0 | Refills: 0 | COMMUNITY

## 2018-12-18 RX ORDER — INFLUENZA VIRUS VACCINE 15; 15; 15; 15 UG/.5ML; UG/.5ML; UG/.5ML; UG/.5ML
0.5 SUSPENSION INTRAMUSCULAR ONCE
Qty: 0 | Refills: 0 | Status: COMPLETED | OUTPATIENT
Start: 2018-12-18 | End: 2018-12-18

## 2018-12-18 RX ORDER — CHOLECALCIFEROL (VITAMIN D3) 125 MCG
1 CAPSULE ORAL
Qty: 0 | Refills: 0 | COMMUNITY

## 2018-12-18 RX ORDER — METOPROLOL TARTRATE 50 MG
1 TABLET ORAL
Qty: 0 | Refills: 0 | COMMUNITY

## 2018-12-18 RX ORDER — LEVOTHYROXINE SODIUM 125 MCG
1 TABLET ORAL
Qty: 0 | Refills: 0 | COMMUNITY

## 2018-12-18 RX ORDER — FAMOTIDINE 10 MG/ML
1 INJECTION INTRAVENOUS
Qty: 0 | Refills: 0 | COMMUNITY

## 2018-12-18 RX ORDER — FOLIC ACID 0.8 MG
1 TABLET ORAL
Qty: 0 | Refills: 0 | COMMUNITY

## 2018-12-18 RX ORDER — METOPROLOL TARTRATE 50 MG
1 TABLET ORAL
Qty: 0 | Refills: 0 | COMMUNITY
Start: 2018-12-18

## 2018-12-18 RX ORDER — PREGABALIN 225 MG/1
1 CAPSULE ORAL
Qty: 0 | Refills: 0 | COMMUNITY

## 2018-12-18 RX ORDER — ALENDRONATE SODIUM 70 MG/1
1 TABLET ORAL
Qty: 0 | Refills: 0 | COMMUNITY

## 2018-12-18 RX ORDER — CLONAZEPAM 1 MG
1 TABLET ORAL
Qty: 0 | Refills: 0 | COMMUNITY

## 2018-12-18 RX ADMIN — HEPARIN SODIUM 5000 UNIT(S): 5000 INJECTION INTRAVENOUS; SUBCUTANEOUS at 06:25

## 2018-12-18 RX ADMIN — Medication 1000 UNIT(S): at 12:26

## 2018-12-18 RX ADMIN — Medication 0.5 MILLIGRAM(S): at 12:25

## 2018-12-18 RX ADMIN — Medication 100 MICROGRAM(S): at 06:25

## 2018-12-18 RX ADMIN — PREGABALIN 1000 MICROGRAM(S): 225 CAPSULE ORAL at 12:26

## 2018-12-18 RX ADMIN — Medication 1 MILLIGRAM(S): at 12:26

## 2018-12-18 RX ADMIN — Medication 50 MILLIGRAM(S): at 06:25

## 2018-12-18 NOTE — DISCHARGE NOTE ADULT - ADDITIONAL INSTRUCTIONS
Please follow up with your nephrologist within 1 week of hospital discharge  - Please follow up with your primary care doctor within 1 week of hospital discharge Please follow up with your nephrologist within 1 week of hospital discharge. You need to be on a fluid restricted diet at home.   - Please follow up with your primary care doctor within 1 week of hospital discharge

## 2018-12-18 NOTE — DISCHARGE NOTE ADULT - HOSPITAL COURSE
HPI:    This is a 59 y/o F with a PMH significant for CVA (4 years ago), HTN, chronic headache, and GERD who presents to the ED with weakness, dizziness, and vomiting. The patient has multiple chronic complaints but most acutely had 3 episodes of watery stools yesterday evening. This morning, she was complaining of weakness and worsening dizziness (felt like the room was spinning) and had 6 episodes of non bilious non bloody vomiting. She has no appetite and has not eaten anything today. She denies eating any new foods, recent travel, or sick contacts. Per the daughter, her mother's blood pressure was elevated to the 190s and therefore they called the PMD Dr. Joni Huerta who suggested she come to the ED for further evaluation. She also complains of generalized abdominal pain and per the daughter she has chronic gas pains in her belly but she felt it was worse today. Also endorsing left sides numbness and tingling for the last month. Per the daughter, the patient had an MRI done outpatient 3 months ago and it was normal. Also endorsing some dysuria for the last few days. The patient has been taking clonazepam 0.5mg BID for the last few weeks to help with anxiety but in the last week the patient self discontinued the medication because she felt it was making her more dizzy.     Brief HPI:   In the ED, the patient has been afebrile, BP: 153//86, HR: 68-75, RR: 18 and sating 100% on room air. She was given 1 L of normal saline along with tylenol and reglan for symptoms. CT head was negative for acute pathology and CT abdomen and pelvis showed mild colitis. She was admitted for colitis c/b hyponatremia.  The colitis resolved with supportive care. GI PCR and stool studies were negative for infectious causes. The hospital course was prolonged hyponatremia 2/2 to polydipsia and metabolic acidosis. Nephrology was consulted. She was placed on hypotonic saline (1/2 NS with sodium bicarb supplementation) and fluid restriction (given the hx of polydipsia). Her sodium corrected appropriately. She also had endorsed a 3 week history of vertigo with problems moving and decreased hearing. She had an MRI which was negative for any acute changes. The case was discussed with Dr. Joni Huerta, outpatient PMD. On 12/18, The patient was seen and evaluated and deemed medically stable for discharge. HPI:    This is a 59 y/o F with a PMH significant for CVA (4 years ago), HTN, chronic headache, and GERD who presents to the ED with weakness, dizziness, and vomiting. The patient has multiple chronic complaints but most acutely had 3 episodes of watery stools yesterday evening. This morning, she was complaining of weakness and worsening dizziness (felt like the room was spinning) and had 6 episodes of non bilious non bloody vomiting. She has no appetite and has not eaten anything today. She denies eating any new foods, recent travel, or sick contacts. Per the daughter, her mother's blood pressure was elevated to the 190s and therefore they called the PMD Dr. Joni Huerta who suggested she come to the ED for further evaluation. She also complains of generalized abdominal pain and per the daughter she has chronic gas pains in her belly but she felt it was worse today. Also endorsing left sides numbness and tingling for the last month. Per the daughter, the patient had an MRI done outpatient 3 months ago and it was normal. Also endorsing some dysuria for the last few days. The patient has been taking clonazepam 0.5mg BID for the last few weeks to help with anxiety but in the last week the patient self discontinued the medication because she felt it was making her more dizzy.     Brief HPI:   In the ED, the patient has been afebrile, BP: 153//86, HR: 68-75, RR: 18 and sating 100% on room air. She was given 1 L of normal saline along with tylenol and reglan for symptoms. CT head was negative for acute pathology and CT abdomen and pelvis showed mild colitis. She was admitted for colitis c/b hyponatremia.  The colitis resolved with supportive care. GI PCR and stool studies were negative for infectious causes. The hospital course was prolonged 2/2 hyponatremia 2/2 to polydipsia and metabolic acidosis. Nephrology was consulted. She was placed on hypotonic saline (1/2 NS with sodium bicarb supplementation) and fluid restriction (given the hx of polydipsia). Her sodium corrected appropriately. She also had endorsed a 3 week history of vertigo with problems moving and decreased hearing. She had an MRI brain which was negative for any acute changes. The case was discussed with Dr. Joni Huerta, outpatient PMD. On 12/18, The patient was seen and evaluated and deemed medically stable for discharge. HPI:    This is a 59 y/o F with a PMH significant for CVA (4 years ago), HTN, chronic headache, and GERD who presents to the ED with weakness, dizziness, and vomiting. The patient has multiple chronic complaints but most acutely had 3 episodes of watery stools yesterday evening. This morning, she was complaining of weakness and worsening dizziness (felt like the room was spinning) and had 6 episodes of non bilious non bloody vomiting. She has no appetite and has not eaten anything today. She denies eating any new foods, recent travel, or sick contacts. Per the daughter, her mother's blood pressure was elevated to the 190s and therefore they called the PMD Dr. Joni Huerta who suggested she come to the ED for further evaluation. She also complains of generalized abdominal pain and per the daughter she has chronic gas pains in her belly but she felt it was worse today. Also endorsing left sided numbness and tingling for the last month. Per the daughter, the patient had an MRI done outpatient 3 months ago and it was normal. Also endorsing some dysuria for the last few days. The patient has been taking clonazepam 0.5mg BID for the last few weeks to help with anxiety but in the last week the patient self discontinued the medication because she felt it was making her more dizzy.     Brief HPI:   In the ED, the patient has been afebrile, BP: 153//86, HR: 68-75, RR: 18 and sating 100% on room air. She was given 1 L of normal saline along with tylenol and reglan for symptoms. CT head was negative for acute pathology and CT abdomen and pelvis showed mild colitis. She was admitted for colitis c/b hyponatremia.  The colitis resolved with supportive care. GI PCR and stool studies were negative for infectious causes. The hospital course was prolonged 2/2 hyponatremia 2/2 to polydipsia and metabolic acidosis. Nephrology was consulted. She was placed on 1/2 NS with sodium bicarb supplementation and fluid restriction (given the hx of polydipsia). Her sodium corrected appropriately. She also had endorsed a 3 week history of vertigo with problems moving and decreased hearing. She had an MRI brain which was negative for any acute changes. The case was discussed with Dr. Joni Huerta, outpatient PMD who will continue to f/u outpatient. On 12/18, The patient was seen and evaluated and deemed medically stable for discharge. She was ambulating w/ PT. Recommended for ELISABET but patient and family wanted h ome w/ home PT.

## 2018-12-18 NOTE — DISCHARGE NOTE ADULT - PLAN OF CARE
Treatment HOME CARE INSTRUCTIONS  Get plenty of rest.  Eat a well-balanced diet.  Call your caregiver for follow-up as recommended.  SEEK IMMEDIATE MEDICAL CARE IF:  You develop chills.  You have an oral temperature above 101.4, not controlled by medicine.  You have extreme weakness, fainting, or dehydration.  You have repeated vomiting.  You develop severe belly (abdominal) pain or are passing bloody or tarry stools HOME CARE INSTRUCTIONS  Only take medicines as directed by your caregiver. Many medicines can make hyponatremia worse. Discuss all your medicines with your caregiver.  Carefully follow any recommended diet, including any fluid restrictions.  You may be asked to repeat lab tests. Follow these directions.  Avoid alcohol and recreational drugs.  SEEK MEDICAL CARE IF:  You develop worsening nausea, fatigue, headache, confusion, or weakness.  Your original hyponatremia symptoms return.  You have problems following the recommended diet.   SEEK IMMEDIATE MEDICAL CARE IF:  You have a seizure.  You faint.  You have ongoing diarrhea or vomiting Please eat low sodium diet and only drink upto 1 Liter of fluids per day.  Please follow up with your nephrologist within 1 week of hospital discharge,

## 2018-12-18 NOTE — PROGRESS NOTE ADULT - PROBLEM SELECTOR PLAN 9
- stable  - continue home famotidine
- stable  - continue home famotidine
- DVT ppx  - DASH diet  - Dispo pending resolution of symptoms    Gissell Pathak  PGY-1 Internal Medicine  Pager: [phone]

## 2018-12-18 NOTE — PROGRESS NOTE ADULT - PROBLEM SELECTOR PLAN 2
Pt with acute on chronic hyponatremia  Urinary work up consistent with polydipsia   Na improved  Continue  fluid restriction 1L/day  Monitor serum sodium   Avoid overcorrection >8 meQ in 24 hrs.

## 2018-12-18 NOTE — PROGRESS NOTE ADULT - SUBJECTIVE AND OBJECTIVE BOX
[facility]   MD GAB REARDON MD RUORU WONG, PA    TEL:  OFFICE: [phone]  DR SHIRLEY CELL: [phone]  ALLI SIN CELL: [phone]  DR. SHANE CELL: [phone]    RENAL FOLLOW UP NOTE  --------------------------------------------------------------------------------  HPI:      Pt seen and examined at bedside.   Sarah STERN, chest pain     PAST HISTORY  --------------------------------------------------------------------------------  No significant changes to PMH, PSH, FHx, SHx, unless otherwise noted    ALLERGIES & MEDICATIONS  --------------------------------------------------------------------------------  Allergies    No Known Allergies    Intolerances      Standing Inpatient Medications  atorvastatin 20 milliGRAM(s) Oral at bedtime  cholecalciferol 1000 Unit(s) Oral daily  clonazePAM Tablet 0.5 milliGRAM(s) Oral daily  cyanocobalamin 1000 MICROGram(s) Oral daily  famotidine    Tablet 40 milliGRAM(s) Oral at bedtime  folic acid 1 milliGRAM(s) Oral daily  heparin  Injectable 5000 Unit(s) SubCutaneous every 8 hours  levothyroxine 100 MICROGram(s) Oral daily  metoprolol succinate ER 50 milliGRAM(s) Oral daily    PRN Inpatient Medications  acetaminophen   Tablet .. 650 milliGRAM(s) Oral every 6 hours PRN      REVIEW OF SYSTEMS  --------------------------------------------------------------------------------  General: no fever  CVS: no chest pain  RESP: no sob, no cough  ABD: no abdominal pain    VITALS/PHYSICAL EXAM  --------------------------------------------------------------------------------  T(C): 36.9 (12-18-18 @ 06:23), Max: 36.9 (12-18-18 @ 06:23)  HR: 75 (12-18-18 @ 06:23) (75 - 101)  BP: 118/76 (12-18-18 @ 06:23) (118/76 - 155/86)  RR: 18 (12-18-18 @ 06:23) (17 - 18)  SpO2: 96% (12-18-18 @ 06:23) (96% - 96%)  Wt(kg): --        12-17-18 @ 07:01  -  12-18-18 @ 07:00  --------------------------------------------------------  IN: 0 mL / OUT: 200 mL / NET: -200 mL      Physical Exam:  	Gen: NAD  	HEENT: MMM  	Pulm: CTA B/L  	CV: S1S2  	Abd: Soft, +BS  	Ext: No LE edema B/L                      Neuro: Awake   	Skin: Warm and Dry   	    LABS/STUDIES  --------------------------------------------------------------------------------              12.3   8.26  >-----------<  226      [12-18-18 @ 05:20]              39.2     139  |  105  |  13  ----------------------------<  93      [12-18-18 @ 05:20]  4.3   |  23  |  1.53        Ca     8.8     [12-18-18 @ 05:20]      Mg     1.7     [12-18-18 @ 05:20]      Phos  3.6     [12-18-18 @ 05:20]            Creatinine Trend:  SCr 1.53 [12-18 @ 05:20]  SCr 1.45 [12-17 @ 06:57]  SCr 1.44 [12-16 @ 05:19]  SCr 1.46 [12-15 @ 06:15]  SCr 1.40 [12-14 @ 20:25]    Urinalysis - [12-14-18 @ 18:38]      Color COLORLESS / Appearance CLEAR / SG 1.006 / pH 6.0      Gluc NEGATIVE / Ketone NEGATIVE  / Bili NEGATIVE / Urobili NORMAL       Blood NEGATIVE / Protein NEGATIVE / Leuk Est TRACE / Nitrite NEGATIVE      RBC 0-2 / WBC 3-5 / Hyaline NEGATIVE / Gran  / Sq Epi OCC / Non Sq Epi  / Bacteria NEGATIVE    Urine Creatinine 23.10      [12-14-18 @ 23:57]  Urine Sodium 56      [12-14-18 @ 23:57]  Urine Potassium 14.7      [12-14-18 @ 23:57]  Urine Chloride 59      [12-14-18 @ 23:57]  Urine Osmolality 191      [12-14-18 @ 23:57]

## 2018-12-18 NOTE — PROGRESS NOTE ADULT - PROBLEM SELECTOR PLAN 7
- Med rec completed.  - D/c on Metoprolol 50 with close outpatient follow up.  - Case discussed with her outpatient PCP.

## 2018-12-18 NOTE — PROGRESS NOTE ADULT - PROBLEM SELECTOR PROBLEM 9
GERD (gastroesophageal reflux disease)
GERD (gastroesophageal reflux disease)
Need for prophylactic measure

## 2018-12-18 NOTE — PROGRESS NOTE ADULT - ASSESSMENT
This is a 59 y/o F with a PMH significant for CVA (4 years ago), HTN, chronic headache, and GERD who is admitted for colitis complicated by hyponatremia. 57 yo woman PMH significant for CVA (4 years ago), HTN, chronic headache, and GERD who is admitted for colitis complicated by hyponatremia now resolved.

## 2018-12-18 NOTE — PROGRESS NOTE ADULT - REASON FOR ADMISSION
Hyponatremia, weakness

## 2018-12-18 NOTE — DISCHARGE NOTE ADULT - OTHER SIGNIFICANT FINDINGS
< from: MR Head No Cont (12.17.18 @ 21:56) >  INTERPRETATION:  HISTORY: Subacute stroke. Left-sided weakness. History   of dizziness..    Description: A noncontrast brain MRI was performed.     Comparison is made to a head CT from 12/14/2018.    The study was performed with sagittal T1, axial T1, T2, FLAIR, SWI, and   diffusion-weighted series with ADC maps.    There is no evidence for acute infarct, acute hemorrhage, mass effect, or   hydrocephalus.    Mild patchy increased T2 and FLAIR signal is present throughout the   periventricular and subcortical white matter of the cerebral hemispheres   without mass effect which most likely represents chronic microvascular   ischemic changes given the patient's age. Cerebral volume loss is noted   with secondary proportional prominence of the sulci and ventricles.    IMPRESSION:    No evidence for acute infarct or acute hemorrhage. Mild chronic white   matter changes are present.    < end of copied text >

## 2018-12-18 NOTE — DISCHARGE NOTE ADULT - CARE PROVIDER_API CALL
Joni Huerta  [address] [place] [address]  Phone: [phone]  Fax: (   )    - Joni Huerta  [address] [place] [address]  Phone: [phone]  Fax: (   )    -    Gregory Burton), Internal Medicine; Nephrology  [address]  [address] [place] [address]  Phone: [phone]  Fax: [phone]

## 2018-12-18 NOTE — PROGRESS NOTE ADULT - ATTENDING COMMENTS
Pt seen and examined 12/18/18 and case d/w patient, family and housestaff.   No further dizziness. Ambulated w/ PT this am. Colitis and hyponatremia resolved. MRI brain neg for acute CVA.   Team d/w PCP who reports pt has had prior CT brain and MRI due to dizziness and other complaints. PCP will f/u outpatient  Pt currently enrolled in PT. Will be d/sofi home w/ further PT. If weakness persists, PCP will f/u back pain. Consider MRI or further imaging  Pt is medically stable for d/c home w/ PCP f/u. D/c planning time 36  mins. See d/c summary for further details

## 2018-12-18 NOTE — PROGRESS NOTE ADULT - PROBLEM SELECTOR PLAN 6
- MRI shows no acute stroke. Patient shows no acute pathologies - MRI shows no acute stroke. Patient shows no acute pathologies  - Can trial meclizine prn for likely peripheral vertigo BPPV

## 2018-12-18 NOTE — PROGRESS NOTE ADULT - PROVIDER SPECIALTY LIST ADULT
Internal Medicine
Nephrology

## 2018-12-18 NOTE — PROGRESS NOTE ADULT - PROBLEM SELECTOR PLAN 1
- Nephrolog following. Appreciate recs.   - D/c on fluid restricted diet of 1 L 2/2 to polydipsia. - Resolved  - Nephrology following. Appreciate recs.   - D/c on fluid restricted diet of 1 L 2/2 to polydipsia.

## 2018-12-18 NOTE — DISCHARGE NOTE ADULT - PROVIDER TOKENS
FREE:[LAST:[Bradley],FIRST:[Joni],PHONE:[phone]],FAX:[(   )    -],ADDRESS:[[address] [place] [address]]] FREE:[LAST:[Bradley],FIRST:[Joni],PHONE:[phone]],FAX:[(   )    -],ADDRESS:[[address] [place] [address]]],TOKEN:'[id number]'

## 2018-12-18 NOTE — DISCHARGE NOTE ADULT - PATIENT PORTAL LINK FT
You can access the [facility] Patient Portal, offered by [facility], by registering with the following website: [email or web address]

## 2018-12-18 NOTE — PROGRESS NOTE ADULT - SUBJECTIVE AND OBJECTIVE BOX
ROBIN DASILVA  58y  Female      Subjective:     NAEO. This am, patient feels fine and was ambulating. She states she would like to go home. Her dizziness was present throughout the day yesterday, but she has not noticed any headaches or dizziness since yesterday morning. She still endorses minor left sided weakness.  No f/c/n/v/d.      Vital Signs Last 24 Hrs  T(C): 36.7 (18 Dec 2018 14:54), Max: 36.7 (18 Dec 2018 14:54)  T(F): 98 (18 Dec 2018 14:54), Max: 98 (18 Dec 2018 14:54)  HR: 99 (18 Dec 2018 14:54) (99 - 99)  BP: 130/60 (18 Dec 2018 14:54) (130/60 - 130/60)  BP(mean): --  RR: 20 (18 Dec 2018 14:54) (20 - 20)  SpO2: 100% (18 Dec 2018 14:54) (100% - 100%)      PHYSICAL EXAM:  GENERAL: NAD, well-groomed, well-developed  HEENT - NC/AT, pupils equal and reactive to light,  ; Moist mucous membranes, Good dentition, No lesions  NECK: Supple, No JVD  CHEST/LUNG: Clear to auscultation bilaterally; No rales, rhonchi, wheezing  HEART: Regular rate and rhythm; No murmurs, rubs, or gallops  ABDOMEN: Soft, Nontender, Nondistended; Bowel sounds present  EXTREMITIES:  2+ Peripheral Pulses, No clubbing, cyanosis, or edema  NEURO:  No Focal deficits, sensory and motor intact; left side slightly weaker (but can still resist), no pronator drift,   SKIN: No rashes or lesion    Consultant(s) Notes Reviewed:  [x ] YES  [ ] NO  Care Discussed with Consultants/Other Providers [ x] YES  [ ] NO    LABS:      The Labs were reviewed by me   The Radiology was reviewed by me    EKG tracing reviewed by me    12-18    139  |  105  |  13  ----------------------------<  93  4.3   |  23  |  1.53<H>  12-17    136  |  99  |  12  ----------------------------<  92  4.2   |  22  |  1.45<H>    Ca    8.8      18 Dec 2018 05:20  Ca    8.9      17 Dec 2018 06:57  Phos  3.6     12-18  Mg     1.7     12-18      Magnesium, Serum: 1.7 mg/dL (12-18-18 @ 05:20)  Magnesium, Serum: 1.6 mg/dL (12-17-18 @ 06:57)    Phosphorus Level, Serum: 3.6 mg/dL (12-18-18 @ 05:20)  Phosphorus Level, Serum: 3.8 mg/dL (12-17-18 @ 06:57)                                              12.3   8.26  )-----------( 226      ( 18 Dec 2018 05:20 )             39.2                         11.4   7.39  )-----------( 206      ( 17 Dec 2018 06:57 )             35.2     CAPILLARY BLOOD GLUCOSE              RADIOLOGY & ADDITIONAL TESTS:    MedsMEDICATIONS  (STANDING):    MEDICATIONS  (PRN): ROBIN DASILVA  58y  Female      Subjective:     No overnight events. This am, patient feels fine and was ambulating. She states she would like to go home. Her dizziness was present throughout the day yesterday, but she has not noticed any headaches or dizziness since yesterday morning. She still endorses minor left sided weakness.  No f/c/n/v/d. Ambulated w/ PT this am. eating well. Denies back pain this am      Vital Signs Last 24 Hrs  T(C): 36.7 (18 Dec 2018 14:54), Max: 36.7 (18 Dec 2018 14:54)  T(F): 98 (18 Dec 2018 14:54), Max: 98 (18 Dec 2018 14:54)  HR: 99 (18 Dec 2018 14:54) (99 - 99)  BP: 130/60 (18 Dec 2018 14:54) (130/60 - 130/60)  BP(mean): --  RR: 20 (18 Dec 2018 14:54) (20 - 20)  SpO2: 100% (18 Dec 2018 14:54) (100% - 100%)      PHYSICAL EXAM:  GENERAL: NAD, well-groomed, well-developed  HEENT - NC/AT, pupils equal and reactive to light,  ; Moist mucous membranes, Good dentition, No lesions  NECK: Supple, No JVD  CHEST/LUNG: Clear to auscultation bilaterally; No rales, rhonchi, wheezing  HEART: Regular rate and rhythm; No murmurs, rubs, or gallops  ABDOMEN: Soft, Nontender, Nondistended; Bowel sounds present  EXTREMITIES:  2+ Peripheral Pulses, No clubbing, cyanosis, or edema  NEURO:  No Focal deficits, sensory and motor intact; left side slightly weaker (but can still resist), no pronator drift,   SKIN: No rashes or lesion    Consultant(s) Notes Reviewed:  [x ] YES  [ ] NO  Care Discussed with Consultants/Other Providers [ x] YES  [ ] NO    LABS: personally reviewed    12-18    139  |  105  |  13  ----------------------------<  93  4.3   |  23  |  1.53<H>  12-17    136  |  99  |  12  ----------------------------<  92  4.2   |  22  |  1.45<H>    Ca    8.8      18 Dec 2018 05:20  Ca    8.9      17 Dec 2018 06:57  Phos  3.6     12-18  Mg     1.7     12-18      Magnesium, Serum: 1.7 mg/dL (12-18-18 @ 05:20)  Magnesium, Serum: 1.6 mg/dL (12-17-18 @ 06:57)    Phosphorus Level, Serum: 3.6 mg/dL (12-18-18 @ 05:20)  Phosphorus Level, Serum: 3.8 mg/dL (12-17-18 @ 06:57)                                              12.3   8.26  )-----------( 226      ( 18 Dec 2018 05:20 )             39.2                         11.4   7.39  )-----------( 206      ( 17 Dec 2018 06:57 )             35.2     CAPILLARY BLOOD GLUCOSE              RADIOLOGY & ADDITIONAL TESTS:    MEDICATIONS reviewed    Imaging reviewed  < from: MR Head No Cont (12.17.18 @ 21:56) >    No evidence for acute infarct or acute hemorrhage. Mild chronic white   matter changes are present.    < end of copied text >

## 2018-12-18 NOTE — DISCHARGE NOTE ADULT - MEDICATION SUMMARY - MEDICATIONS TO TAKE
I will START or STAY ON the medications listed below when I get home from the hospital:    rolling walker  -- Rolling Walker ICD: R26.2  -- Indication: For Physical weakness    clonazePAM 0.5 mg oral tablet  -- 1 tab(s) by mouth 2 times a day, As Needed  -- Indication: For Anxiety    Reglan 5 mg oral tablet  -- 1 tab(s) by mouth 3 times a day (after meals), As Needed  -- Indication: For Nausea    atorvastatin 20 mg oral tablet  -- 1 tab(s) by mouth once a day (at bedtime)  -- Indication: For HLD    metoprolol succinate 50 mg oral tablet, extended release  -- 1 tab(s) by mouth once a day  -- Indication: For Hypertension    Fosamax 70 mg oral tablet  -- 1 tab(s) by mouth once a week  -- Indication: For Osteoporosis    famotidine 40 mg oral tablet  -- 1 tab(s) by mouth once a day (at bedtime)  -- Indication: For GERD (gastroesophageal reflux disease)    levothyroxine 100 mcg (0.1 mg) oral capsule  -- 1 cap(s) by mouth once a day  -- Indication: For Hypothyroidism    folic acid 1 mg oral tablet  -- 1 tab(s) by mouth once a day  -- Indication: For Multivitamin    Vitamin B12 1000 mcg oral tablet  -- 1 tab(s) by mouth once a day  -- Indication: For Multivitamin    Vitamin D3 1000 intl units oral tablet  -- 1 tab(s) by mouth once a day  -- Indication: For Multivtiamin

## 2018-12-18 NOTE — DISCHARGE NOTE ADULT - DURABLE MEDICAL EQUIPMENT AGENCY
[facility] [employer] [phone]   Rolling walker delivered to pt's bedside 12/18

## 2018-12-18 NOTE — DISCHARGE NOTE ADULT - CARE PLAN
Principal Discharge DX:	Colitis  Goal:	Treatment  Assessment and plan of treatment:	HOME CARE INSTRUCTIONS  Get plenty of rest.  Eat a well-balanced diet.  Call your caregiver for follow-up as recommended.  SEEK IMMEDIATE MEDICAL CARE IF:  You develop chills.  You have an oral temperature above 101.4, not controlled by medicine.  You have extreme weakness, fainting, or dehydration.  You have repeated vomiting.  You develop severe belly (abdominal) pain or are passing bloody or tarry stools  Secondary Diagnosis:	Hyponatremia  Goal:	Treatment  Assessment and plan of treatment:	HOME CARE INSTRUCTIONS  Only take medicines as directed by your caregiver. Many medicines can make hyponatremia worse. Discuss all your medicines with your caregiver.  Carefully follow any recommended diet, including any fluid restrictions.  You may be asked to repeat lab tests. Follow these directions.  Avoid alcohol and recreational drugs.  SEEK MEDICAL CARE IF:  You develop worsening nausea, fatigue, headache, confusion, or weakness.  Your original hyponatremia symptoms return.  You have problems following the recommended diet.   SEEK IMMEDIATE MEDICAL CARE IF:  You have a seizure.  You faint.  You have ongoing diarrhea or vomiting Principal Discharge DX:	Colitis  Goal:	Treatment  Assessment and plan of treatment:	HOME CARE INSTRUCTIONS  Get plenty of rest.  Eat a well-balanced diet.  Call your caregiver for follow-up as recommended.  SEEK IMMEDIATE MEDICAL CARE IF:  You develop chills.  You have an oral temperature above 101.4, not controlled by medicine.  You have extreme weakness, fainting, or dehydration.  You have repeated vomiting.  You develop severe belly (abdominal) pain or are passing bloody or tarry stools  Secondary Diagnosis:	Hyponatremia  Goal:	Treatment  Assessment and plan of treatment:	HOME CARE INSTRUCTIONS  Only take medicines as directed by your caregiver. Many medicines can make hyponatremia worse. Discuss all your medicines with your caregiver.  Carefully follow any recommended diet, including any fluid restrictions.  You may be asked to repeat lab tests. Follow these directions.  Avoid alcohol and recreational drugs.  SEEK MEDICAL CARE IF:  You develop worsening nausea, fatigue, headache, confusion, or weakness.  Your original hyponatremia symptoms return.  You have problems following the recommended diet.   SEEK IMMEDIATE MEDICAL CARE IF:  You have a seizure.  You faint.  You have ongoing diarrhea or vomiting  Secondary Diagnosis:	Polydipsia  Assessment and plan of treatment:	Please eat low sodium diet and only drink upto 1 Liter of fluids per day.  Please follow up with your nephrologist within 1 week of hospital discharge,

## 2018-12-20 DIAGNOSIS — Z71.89 OTHER SPECIFIED COUNSELING: ICD-10-CM

## 2018-12-24 DIAGNOSIS — Z76.89 PERSONS ENCOUNTERING HEALTH SERVICES IN OTHER SPECIFIED CIRCUMSTANCES: ICD-10-CM

## 2019-01-02 NOTE — PHYSICAL THERAPY INITIAL EVALUATION ADULT - ASR WT BEARING STATUS EVAL
Detail Level: Detailed
no weight-bearing restrictions
Quality 337: Tuberculosis Prevention For Psoriasis And Psoriatic Arthritis Patients On A Biological Immune Response Modifier: Patient has a documented negative annual TB screening.
Quality 410: Psoriasis Clinical Response To Oral Systemic Or Biologic Mediations: Psoriasis Assessment Tool Documented, Met Specified Benchmark

## 2019-03-01 PROCEDURE — G9005: CPT

## 2019-07-08 NOTE — ED PROVIDER NOTE - CROS ED CONS ALL NEG
Anticipated Discharge Disposition: SNF    Action: LSW spoke to pt and son, Corbin at pt's bedside. LSW explained that inpatient rehab had declined. Corbin stated that they would like for pt to go to SNF because pt has an appointment with pulmonology on the 19th. Pt has been to [facility] in the past. After her appointment they would like a few days for her caregiver to settle in back home in [place] to do hospice at home.     Barriers to Discharge: SNF placement    Plan: Dana to talk to pt and Corbin about SNF CHOICE.      - - -

## 2021-06-05 NOTE — ED ADULT NURSE NOTE - BREATH SOUNDS, MLM
Refill request: Sertraline 100mg  Last office visit: 1.6.2020  Last refilled: 12.24.2019 dips 30 no refills   Clear
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
## 2023-06-18 NOTE — PROGRESS NOTE ADULT - PROBLEM SELECTOR PLAN 10
Initial (On Arrival)
- DVT ppx  - DASH diet  - Dispo home today    Gissell Pathak  PGY-1 Internal Medicine  Pager: [phone]
- DVT ppx  - DASH diet  - Dispo pending resolution of symptoms    Gissell Pathak  PGY-1 Internal Medicine  Pager: [phone]

## 2024-03-28 NOTE — ED PROVIDER NOTE - CROS ED NEURO POS
229.9 DIFFICULTY WALKING / IMBALANCE/dizziness Abbe Flap (Upper To Lower Lip) Text: The defect of the lower lip was assessed and measured.  Given the location and size of the defect, an Abbe flap was deemed most appropriate.  Using a sterile surgical marker, an appropriate Abbe flap was measured and drawn on the upper lip. Local anesthesia was then infiltrated.  A scalpel was then used to incise the upper lip through and through the skin, vermilion, muscle and mucosa, leaving the flap pedicled on the opposite side.  The flap was then rotated and transferred to the lower lip defect.  The flap was then sutured into place with a three layer technique, closing the orbicularis oris muscle layer with subcutaneous buried sutures, followed by a mucosal layer and an epidermal layer.

## 2025-04-14 NOTE — PROGRESS NOTE ADULT - PROBLEM SELECTOR PROBLEM 6
Run PA for skin substitutes.   Return 1 week    Wound Cleaning and Dressings:    Shower with protection - use Shower boot   DRESSINGS:   All wounds: collagen / HF transfer on all open areas.   Zinc barrier cream kailyn-wound  Change dressing weekly     Compression Therapy : UNNA 20-30 mm hg  + added spandagrip    Compression Therapy Instructions:  1.  Put on first thing in the morning and may remove at bedside.  Okay to wear overnight      if comfortable.  Do not let stockings roll up/down and kink.  Hand wash stockings and      hang dry as needed.    2.  Avoid prolonged standing in one place.  It is better to have your calf muscles moving       to pump fluid out of the legs.    3.  Elevate leg(s) above the level of the heart when sitting or as much as possible.    4.  Take your diuretics as directed by your provider.  Do not skip doses or change doses      unless instructed to do so by your provider.    5. Do not get leg(s) with compression wrap wet. If wraps are too tight as indicated        By pain, numbness/tingling or discoloration of toes remove wrap completely       and call the   wound center.     Off-Loading:  Offload wounded areas.     Miscellaneous Instructions:  Supplement with a daily multivitamin   Low salt diet  Increase protein intake / consider protein supplements - see below  Elevate extremities at all times when sitting / laying down.    DIETARY MODIFICATIONS TO HELP WITH WOUND HEALING:    Protein: Meats, beans, eggs, milk and yogurt particularly Greek yogurt), tofu, soy nuts, soy protein products    Vitamin C: Citrus fruits and juices, strawberries, tomatoes, tomato juice, peppers, baked potatoes, spinach, broccoli, cauliflower, [place] sprouts, cabbage    Vitamin A: Dark green, leafy vegetables, orange or yellow vegetables, cantaloupe, fortified dairy products, liver, fortified cereals    Zinc: Fortified cereals, red meats, seafood    Consider [employer] (These are essential branch  chain amino acids that help with tissue building and wound healing) and take 2 packets/day. you can order online at [employer] or [employer]    ADDITIONAL REMINDERS:    The treatment plan has been discussed at length with you and your provider. Follow all instructions carefully, it is very important. If you do not follow all instructions, you are at  risk of your wound not healing, infection, possible loss of limb and even end of life.  Please call the clinic during regular business hours ( 7:30 AM - 5:30 PM) if you notice increased bleeding, redness, warmth, pain or pus like drainage or start running a fever greater than 100.3.    For after hour emergencies, please call your primary physician or go to the nearest emergency room.   Dizziness

## 2025-05-16 NOTE — DISCHARGE NOTE ADULT - NSTOBACCONEVERSMOKERY/N_GEN_A
Received patient on SBT. Pt extubated to bipap. OGT removed, NGT inserted, CXR confirmed placement. MRSA swabbed. TTE completed. Pt dangled at side of bed with PT, speech  eval deferred due to continuous bipap use. Tube feed currently held per rani ALVES for FWF and meds. Endorsed care to oncoming RN.    No